# Patient Record
Sex: MALE | Race: WHITE | NOT HISPANIC OR LATINO | Employment: PART TIME | URBAN - NONMETROPOLITAN AREA
[De-identification: names, ages, dates, MRNs, and addresses within clinical notes are randomized per-mention and may not be internally consistent; named-entity substitution may affect disease eponyms.]

---

## 2023-01-14 ENCOUNTER — HOSPITAL ENCOUNTER (EMERGENCY)
Facility: HOSPITAL | Age: 58
Discharge: HOME | End: 2023-01-14
Attending: EMERGENCY MEDICINE

## 2023-01-14 VITALS
BODY MASS INDEX: 35.25 KG/M2 | RESPIRATION RATE: 18 BRPM | TEMPERATURE: 97 F | HEIGHT: 68 IN | OXYGEN SATURATION: 98 % | SYSTOLIC BLOOD PRESSURE: 202 MMHG | HEART RATE: 80 BPM | WEIGHT: 232.59 LBS | DIASTOLIC BLOOD PRESSURE: 111 MMHG

## 2023-01-14 DIAGNOSIS — L03.115 BILATERAL LOWER LEG CELLULITIS: Primary | ICD-10-CM

## 2023-01-14 DIAGNOSIS — L03.116 BILATERAL LOWER LEG CELLULITIS: Primary | ICD-10-CM

## 2023-01-14 DIAGNOSIS — L40.0 PLAQUE PSORIASIS: ICD-10-CM

## 2023-01-14 RX ORDER — LEVETIRACETAM 750 MG/1
1500 TABLET ORAL
COMMUNITY
Start: 2022-12-08

## 2023-01-14 RX ORDER — ASPIRIN 81 MG/1
81 TABLET ORAL
COMMUNITY

## 2023-01-14 RX ORDER — TAMSULOSIN HYDROCHLORIDE 0.4 MG/1
0.4 CAPSULE ORAL
COMMUNITY

## 2023-01-14 RX ORDER — ATORVASTATIN CALCIUM 80 MG/1
80 TABLET, FILM COATED ORAL DAILY
COMMUNITY

## 2023-01-14 RX ORDER — MELATONIN
2000 DAILY
COMMUNITY

## 2023-01-14 RX ORDER — HYDROCODONE BITARTRATE AND ACETAMINOPHEN 5; 325 MG/1; MG/1
1 TABLET ORAL EVERY 6 HOURS PRN
Qty: 10 TABLET | Refills: 0 | Status: SHIPPED | OUTPATIENT
Start: 2023-01-14

## 2023-01-14 RX ORDER — OMEPRAZOLE 40 MG/1
40 CAPSULE, DELAYED RELEASE ORAL
COMMUNITY

## 2023-01-14 RX ORDER — LEVOTHYROXINE SODIUM 0.12 MG/1
TABLET ORAL
COMMUNITY
Start: 2022-10-11

## 2023-01-14 RX ORDER — LISINOPRIL 40 MG/1
40 TABLET ORAL
COMMUNITY

## 2023-01-14 RX ORDER — INSULIN GLARGINE 100 [IU]/ML
50 INJECTION, SOLUTION SUBCUTANEOUS
COMMUNITY

## 2023-01-14 RX ORDER — AMLODIPINE BESYLATE 5 MG/1
2.5 TABLET ORAL EVERY MORNING
COMMUNITY
Start: 2022-08-10 | End: 2023-08-10

## 2023-01-14 RX ORDER — CEPHALEXIN 500 MG/1
500 CAPSULE ORAL 4 TIMES DAILY
Qty: 40 CAPSULE | Refills: 0 | Status: SHIPPED | OUTPATIENT
Start: 2023-01-14 | End: 2023-01-24

## 2023-01-14 RX ORDER — CEPHALEXIN 250 MG/1
500 CAPSULE ORAL ONCE
Status: COMPLETED | OUTPATIENT
Start: 2023-01-14 | End: 2023-01-14

## 2023-01-14 RX ORDER — DULAGLUTIDE 4.5 MG/.5ML
INJECTION, SOLUTION SUBCUTANEOUS
COMMUNITY
Start: 2022-07-27

## 2023-01-14 RX ORDER — ROPINIROLE 2 MG/1
2 TABLET, FILM COATED ORAL
COMMUNITY

## 2023-01-14 RX ORDER — DEXMETHYLPHENIDATE HYDROCHLORIDE 40 MG/1
40 CAPSULE, EXTENDED RELEASE ORAL DAILY
COMMUNITY

## 2023-01-14 RX ORDER — LAMOTRIGINE 100 MG/1
150 TABLET ORAL
COMMUNITY

## 2023-01-14 RX ORDER — HYDROCODONE BITARTRATE AND ACETAMINOPHEN 5; 325 MG/1; MG/1
1 TABLET ORAL ONCE
Status: COMPLETED | OUTPATIENT
Start: 2023-01-14 | End: 2023-01-14

## 2023-01-14 RX ORDER — SERTRALINE HYDROCHLORIDE 100 MG/1
100 TABLET, FILM COATED ORAL DAILY
COMMUNITY

## 2023-01-14 RX ADMIN — HYDROCODONE BITARTRATE AND ACETAMINOPHEN 1 TABLET: 5; 325 TABLET ORAL at 17:18

## 2023-01-14 RX ADMIN — CEPHALEXIN 500 MG: 250 CAPSULE ORAL at 17:17

## 2023-01-14 NOTE — ED PROVIDER NOTES
History  Chief Complaint   Patient presents with   • Leg Swelling     States received Tremfya injection on 12/28/22, short after started with redness/swelling/open areas to bilateral lower legs, right worse than left, believe allergic reaction     Patient is a 78-year-old male presents the emergency department complaining of pain redness and swelling in the bilateral lower extremities and circular patches with white plaques on the lower extremities patient has a history of plaque psoriasis for which she recently was treated with Tremfya and following this developed the pain redness and swelling in the legs  Patient was treated for this at outside hospital and evaluated had no white blood cell count elevation was suspected to be an allergic reaction secondary to the medications lesions now are draining some purulent material   No fevers not dizzy or lightheaded  History provided by:  Patient  Leg Pain  Location:  Leg  Time since incident:  1 week  Injury: no    Leg location:  L lower leg and R lower leg  Associated symptoms: no fatigue and no fever        Prior to Admission Medications   Prescriptions Last Dose Informant Patient Reported? Taking?    Insulin Glargine Solostar (Basaglar KwikPen) 100 UNIT/ML SOPN   Yes No   Sig: Inject 50 Units under the skin   Mometasone Furo-Formoterol Fum (DULERA IN)   Yes Yes   Sig: Inhale if needed   amLODIPine (NORVASC) 5 mg tablet   Yes Yes   Sig: Take 2 5 mg by mouth every morning   aspirin (ECOTRIN LOW STRENGTH) 81 mg EC tablet   Yes No   Sig: Take 81 mg by mouth   atorvastatin (LIPITOR) 80 mg tablet   Yes Yes   Sig: Take 80 mg by mouth daily   cholecalciferol (VITAMIN D3) 1,000 units tablet   Yes Yes   Sig: Take 2,000 Units by mouth daily   dexmethylphenidate (FOCALIN XR) 40 MG 24 hr capsule   Yes Yes   Sig: Take 40 mg by mouth daily   dulaglutide (Trulicity) 4 5 NK/6 3EJ injection   Yes Yes   Sig: INJECT 0 5ML (4 5MG) INTO THE SKIN EVERY 7 DAYS   insulin aspart, w/niacinamide, (FIASP) 100 Units/mL injection pen   Yes No   Sig: Inject 18 Units under the skin   lamoTRIgine (LaMICtal) 100 mg tablet   Yes No   Sig: Take 150 mg by mouth   levETIRAcetam (KEPPRA) 750 mg tablet   Yes Yes   Sig: Take 1,500 mg by mouth   levothyroxine 125 mcg tablet   Yes Yes   Sig: Take 2 tablets (250 mcg) daily   lisinopril (ZESTRIL) 40 mg tablet   Yes No   Sig: Take 40 mg by mouth   omeprazole (PriLOSEC) 40 MG capsule   Yes No   Sig: Take 40 mg by mouth   rOPINIRole (REQUIP) 2 mg tablet   Yes No   Sig: Take 2 mg by mouth   sertraline (ZOLOFT) 100 mg tablet   Yes Yes   Sig: Take 100 mg by mouth daily   tamsulosin (Flomax) 0 4 mg   Yes Yes   Sig: Take 0 4 mg by mouth daily with dinner      Facility-Administered Medications: None       Past Medical History:   Diagnosis Date   • CKD (chronic kidney disease)    • Diabetes mellitus ([place] [id number] )    • Disease of thyroid gland    • High cholesterol    • Hypertension    • Seizure ([place] [address] )        History reviewed  No pertinent surgical history  History reviewed  No pertinent family history  I have reviewed and agree with the history as documented  E-Cigarette/Vaping   • E-Cigarette Use Current Every Day User      E-Cigarette/Vaping Substances   • Nicotine Yes      Social History     Tobacco Use   • Smoking status: Some Days     Types: Cigars   • Smokeless tobacco: Never   Vaping Use   • Vaping Use: Every day   • Substances: Nicotine   Substance Use Topics   • Alcohol use: Not Currently   • Drug use: Not Currently     Types: Cocaine       Review of Systems   Constitutional: Negative for activity change, appetite change, chills, fatigue and fever  HENT: Negative for congestion, ear pain, rhinorrhea and sore throat  Eyes: Negative for discharge, redness and visual disturbance  Respiratory: Negative for cough, chest tightness, shortness of breath and wheezing  Cardiovascular: Negative for chest pain and palpitations     Gastrointestinal: Negative for abdominal pain, constipation, diarrhea, nausea and vomiting  Endocrine: Negative for polydipsia and polyuria  Genitourinary: Negative for difficulty urinating, dysuria, frequency, hematuria and urgency  Musculoskeletal: Negative for arthralgias and myalgias  Bilateral lower leg lesions rash redness swelling drainage pain   Skin: Positive for color change, rash and wound  Negative for pallor  Neurological: Negative for dizziness, weakness, light-headedness, numbness and headaches  Hematological: Negative for adenopathy  Does not bruise/bleed easily  All other systems reviewed and are negative  Physical Exam  Physical Exam  Vitals and nursing note reviewed  Constitutional:       Appearance: He is well-developed  HENT:      Head: Normocephalic and atraumatic  Right Ear: External ear normal       Left Ear: External ear normal       Nose: Nose normal    Eyes:      Conjunctiva/sclera: Conjunctivae normal       Pupils: Pupils are equal, round, and reactive to light  Cardiovascular:      Rate and Rhythm: Normal rate and regular rhythm  Heart sounds: Normal heart sounds  Pulmonary:      Effort: Pulmonary effort is normal  No respiratory distress  Breath sounds: Normal breath sounds  No wheezing or rales  Chest:      Chest wall: No tenderness  Abdominal:      General: Bowel sounds are normal  There is no distension  Palpations: Abdomen is soft  Tenderness: There is no abdominal tenderness  There is no guarding  Musculoskeletal:         General: Normal range of motion  Cervical back: Normal range of motion and neck supple  Right lower leg: Edema present  Left lower leg: Edema present  Skin:     General: Skin is warm and dry  Findings: Erythema and rash present  Rash is crusting and pustular        Comments: Multiple superficial plaque psoriasis lesions on the bilateral lower extremities and feet and ankles with surrounding erythema and some purulent discharge from the lesions  No large abscess or fluid collections   Neurological:      Mental Status: He is alert and oriented to person, place, and time  Cranial Nerves: No cranial nerve deficit  Sensory: No sensory deficit  Vital Signs  ED Triage Vitals [01/14/23 1704]   Temperature Pulse Respirations Blood Pressure SpO2   (!) 97 °F (36 1 °C) 80 18 (!) 202/111 98 %      Temp src Heart Rate Source Patient Position - Orthostatic VS BP Location FiO2 (%)   -- Monitor Lying Right arm --      Pain Score       --           Vitals:    01/14/23 1704   BP: (!) 202/111   Pulse: 80   Patient Position - Orthostatic VS: Lying         Visual Acuity      ED Medications  Medications   cephalexin (KEFLEX) capsule 500 mg (500 mg Oral Given 1/14/23 1717)   HYDROcodone-acetaminophen (NORCO) 5-325 mg per tablet 1 tablet (1 tablet Oral Given 1/14/23 1718)       Diagnostic Studies  Results Reviewed     None                 No orders to display              Procedures  Procedures         ED Course                                             Medical Decision Making  Patient is afebrile nontoxic well-appearing clinically and hemodynamically stable in the emergency department history and examination concerning for infected plaque psoriasis lesions and cellulitis at this time we will treat with antibiotics advised to avoid taking the biologic medication any further and recommended supportive care plenty fluids pain control for now and prompt follow-up with primary physician for further evaluation and treatment return precautions and anticipatory guidance discussed  Bilateral lower leg cellulitis: complicated acute illness or injury  Plaque psoriasis: complicated acute illness or injury  Amount and/or Complexity of Data Reviewed  External Data Reviewed: labs, radiology, ECG and notes  Risk  Prescription drug management            Disposition  Final diagnoses:   Bilateral lower leg cellulitis   Plaque psoriasis - bilateral lower legs infected     Time reflects when diagnosis was documented in both MDM as applicable and the Disposition within this note     Time User Action Codes Description Comment    1/14/2023  5:15 PM Oralee Pro Add [R60 0] Bilateral lower extremity edema     1/14/2023  5:15 PM Oralee Pro Add [L03 116,  X42 718] Bilateral lower leg cellulitis     1/14/2023  5:15 PM Oralee Pro Modify [R13 415,  G69 030] Bilateral lower leg cellulitis     1/14/2023  5:15 PM RemVan oscar Remove [R60 0] Bilateral lower extremity edema     1/14/2023  5:15 PM RemaleVan perry Add [L40 0] Plaque psoriasis     1/14/2023  5:16 PM RemVan oscar Modify [L40 0] Plaque psoriasis Lateral lower legs infected    1/14/2023  5:16 PM RemaleVan perry Modify [L40 0] Plaque psoriasis bilateral lower legs infected      ED Disposition     ED Disposition   Discharge    Condition   Stable    Date/Time   Sat Jan 14, 2023  5:15 PM    Comment   Oralee Pro Javed Sr  discharge to home/self care  Follow-up Information    None         Patient's Medications   Discharge Prescriptions    CEPHALEXIN (KEFLEX) 500 MG CAPSULE    Take 1 capsule (500 mg total) by mouth 4 (four) times a day for 10 days       Start Date: 1/14/2023 End Date: 1/24/2023       Order Dose: 500 mg       Quantity: 40 capsule    Refills: 0    HYDROCODONE-ACETAMINOPHEN (NORCO) 5-325 MG PER TABLET    Take 1 tablet by mouth every 6 (six) hours as needed for pain for up to 10 doses Max Daily Amount: 4 tablets       Start Date: 1/14/2023 End Date: --       Order Dose: 1 tablet       Quantity: 10 tablet    Refills: 0       No discharge procedures on file      PDMP Review     None          ED Provider  Electronically Signed by           Isabel Ernst DO  01/14/23 5339

## 2023-01-14 NOTE — DISCHARGE INSTRUCTIONS
Apply frequent warm soapy compresses to the affected areas follow-up promptly with your primary physician for reevaluation

## 2024-02-06 ENCOUNTER — HOSPITAL ENCOUNTER (INPATIENT)
Facility: HOSPITAL | Age: 59
LOS: 2 days | Discharge: HOME HEALTH | DRG: 682 | End: 2024-02-08
Attending: EMERGENCY MEDICINE | Admitting: FAMILY MEDICINE
Payer: MEDICARE

## 2024-02-06 ENCOUNTER — APPOINTMENT (EMERGENCY)
Dept: CT IMAGING | Facility: HOSPITAL | Age: 59
DRG: 682 | End: 2024-02-06
Payer: MEDICARE

## 2024-02-06 DIAGNOSIS — R29.90 STROKE-LIKE SYMPTOMS: Primary | ICD-10-CM

## 2024-02-06 DIAGNOSIS — E11.00 TYPE 2 DIABETES MELLITUS WITH HYPEROSMOLAR HYPERGLYCEMIC STATE (HHS) (HCC): ICD-10-CM

## 2024-02-06 DIAGNOSIS — E03.8 OTHER SPECIFIED HYPOTHYROIDISM: ICD-10-CM

## 2024-02-06 DIAGNOSIS — R73.9 HYPERGLYCEMIA: ICD-10-CM

## 2024-02-06 PROBLEM — K70.30 ALCOHOLIC CIRRHOSIS (HCC): Status: ACTIVE | Noted: 2024-02-06

## 2024-02-06 PROBLEM — R79.89 PSEUDOHYPONATREMIA: Status: ACTIVE | Noted: 2024-02-06

## 2024-02-06 PROBLEM — R56.9 SEIZURE (HCC): Status: ACTIVE | Noted: 2024-02-06

## 2024-02-06 PROBLEM — I10 HYPERTENSION: Status: ACTIVE | Noted: 2024-02-06

## 2024-02-06 PROBLEM — N17.9 AKI (ACUTE KIDNEY INJURY) (HCC): Status: ACTIVE | Noted: 2024-02-06

## 2024-02-06 PROBLEM — J44.1 CHRONIC OBSTRUCTIVE PULMONARY DISEASE WITH ACUTE EXACERBATION (HCC): Status: ACTIVE | Noted: 2024-02-06

## 2024-02-06 PROBLEM — G93.41 ACUTE METABOLIC ENCEPHALOPATHY: Status: ACTIVE | Noted: 2024-02-06

## 2024-02-06 LAB
2HR DELTA HS TROPONIN: 2 NG/L
4HR DELTA HS TROPONIN: 2 NG/L
ALBUMIN SERPL BCP-MCNC: 5 G/DL (ref 3.5–5)
ALP SERPL-CCNC: 139 U/L (ref 34–104)
ALT SERPL W P-5'-P-CCNC: 256 U/L (ref 7–52)
AMMONIA PLAS-SCNC: 63 UMOL/L (ref 18–72)
AMPHETAMINES SERPL QL SCN: NEGATIVE
ANION GAP SERPL CALCULATED.3IONS-SCNC: 12 MMOL/L
APTT PPP: 28 SECONDS (ref 23–37)
AST SERPL W P-5'-P-CCNC: 147 U/L (ref 13–39)
BACTERIA UR QL AUTO: NORMAL /HPF
BARBITURATES UR QL: NEGATIVE
BASE EX.OXY STD BLDV CALC-SCNC: 80.1 % (ref 60–80)
BASE EXCESS BLDV CALC-SCNC: 0.7 MMOL/L
BENZODIAZ UR QL: NEGATIVE
BETA-HYDROXYBUTYRATE: 0.1 MMOL/L
BILIRUB DIRECT SERPL-MCNC: 0.16 MG/DL (ref 0–0.2)
BILIRUB SERPL-MCNC: 0.74 MG/DL (ref 0.2–1)
BILIRUB UR QL STRIP: NEGATIVE
BNP SERPL-MCNC: 13 PG/ML (ref 0–100)
BUN SERPL-MCNC: 27 MG/DL (ref 5–25)
CALCIUM SERPL-MCNC: 10.5 MG/DL (ref 8.4–10.2)
CARDIAC TROPONIN I PNL SERPL HS: 7 NG/L
CARDIAC TROPONIN I PNL SERPL HS: 9 NG/L
CARDIAC TROPONIN I PNL SERPL HS: 9 NG/L
CHLORIDE SERPL-SCNC: 88 MMOL/L (ref 96–108)
CK SERPL-CCNC: 657 U/L (ref 39–308)
CLARITY UR: CLEAR
CO2 SERPL-SCNC: 26 MMOL/L (ref 21–32)
COCAINE UR QL: NEGATIVE
COLOR UR: YELLOW
CREAT SERPL-MCNC: 1.61 MG/DL (ref 0.6–1.3)
ERYTHROCYTE [DISTWIDTH] IN BLOOD BY AUTOMATED COUNT: 13.9 % (ref 11.6–15.1)
ETHANOL SERPL-MCNC: <10 MG/DL
FLUAV RNA RESP QL NAA+PROBE: NEGATIVE
FLUBV RNA RESP QL NAA+PROBE: NEGATIVE
GFR SERPL CREATININE-BSD FRML MDRD: 46 ML/MIN/1.73SQ M
GLUCOSE SERPL-MCNC: 341 MG/DL (ref 65–140)
GLUCOSE SERPL-MCNC: 395 MG/DL (ref 65–140)
GLUCOSE SERPL-MCNC: 419 MG/DL (ref 65–140)
GLUCOSE SERPL-MCNC: 629 MG/DL (ref 65–140)
GLUCOSE SERPL-MCNC: >500 MG/DL (ref 65–140)
GLUCOSE UR STRIP-MCNC: ABNORMAL MG/DL
HCO3 BLDV-SCNC: 24.9 MMOL/L (ref 24–30)
HCT VFR BLD AUTO: 42.4 % (ref 36.5–49.3)
HGB BLD-MCNC: 14.4 G/DL (ref 12–17)
HGB UR QL STRIP.AUTO: ABNORMAL
INR PPP: 0.89 (ref 0.84–1.19)
KETONES UR STRIP-MCNC: NEGATIVE MG/DL
LACTATE SERPL-SCNC: 1.7 MMOL/L (ref 0.5–2)
LACTATE SERPL-SCNC: 2.2 MMOL/L (ref 0.5–2)
LACTATE SERPL-SCNC: 2.3 MMOL/L (ref 0.5–2)
LACTATE SERPL-SCNC: 2.9 MMOL/L (ref 0.5–2)
LEUKOCYTE ESTERASE UR QL STRIP: ABNORMAL
LIPASE SERPL-CCNC: 117 U/L (ref 11–82)
MCH RBC QN AUTO: 31.8 PG (ref 26.8–34.3)
MCHC RBC AUTO-ENTMCNC: 34 G/DL (ref 31.4–37.4)
MCV RBC AUTO: 94 FL (ref 82–98)
METHADONE UR QL: NEGATIVE
NITRITE UR QL STRIP: NEGATIVE
NON-SQ EPI CELLS URNS QL MICRO: NORMAL /HPF
O2 CT BLDV-SCNC: 16.6 ML/DL
OPIATES UR QL SCN: NEGATIVE
OSMOLALITY UR/SERPL-RTO: 325 MMOL/KG (ref 282–298)
OXYCODONE+OXYMORPHONE UR QL SCN: NEGATIVE
PCO2 BLDV: 38.7 MM HG (ref 42–50)
PCP UR QL: NEGATIVE
PH BLDV: 7.43 [PH] (ref 7.3–7.4)
PH UR STRIP.AUTO: 5.5 [PH]
PHOSPHATE SERPL-MCNC: 4.5 MG/DL (ref 2.7–4.5)
PLATELET # BLD AUTO: 150 THOUSANDS/UL (ref 149–390)
PMV BLD AUTO: 13.5 FL (ref 8.9–12.7)
PO2 BLDV: 48.2 MM HG (ref 35–45)
POTASSIUM SERPL-SCNC: 4.6 MMOL/L (ref 3.5–5.3)
PROCALCITONIN SERPL-MCNC: 0.24 NG/ML
PROT SERPL-MCNC: 8.5 G/DL (ref 6.4–8.4)
PROT UR STRIP-MCNC: ABNORMAL MG/DL
PROTHROMBIN TIME: 12.3 SECONDS (ref 11.6–14.5)
RBC # BLD AUTO: 4.53 MILLION/UL (ref 3.88–5.62)
RBC #/AREA URNS AUTO: NORMAL /HPF
RSV RNA RESP QL NAA+PROBE: NEGATIVE
SARS-COV-2 RNA RESP QL NAA+PROBE: NEGATIVE
SODIUM SERPL-SCNC: 126 MMOL/L (ref 135–147)
SP GR UR STRIP.AUTO: 1.01 (ref 1–1.03)
THC UR QL: NEGATIVE
TSH SERPL DL<=0.05 MIU/L-ACNC: 69.7 UIU/ML (ref 0.45–4.5)
UROBILINOGEN UR QL STRIP.AUTO: 0.2 E.U./DL
WBC # BLD AUTO: 8.82 THOUSAND/UL (ref 4.31–10.16)
WBC #/AREA URNS AUTO: NORMAL /HPF

## 2024-02-06 PROCEDURE — 82550 ASSAY OF CK (CPK): CPT | Performed by: FAMILY MEDICINE

## 2024-02-06 PROCEDURE — 0241U HB NFCT DS VIR RESP RNA 4 TRGT: CPT | Performed by: EMERGENCY MEDICINE

## 2024-02-06 PROCEDURE — 81001 URINALYSIS AUTO W/SCOPE: CPT | Performed by: EMERGENCY MEDICINE

## 2024-02-06 PROCEDURE — 87040 BLOOD CULTURE FOR BACTERIA: CPT | Performed by: EMERGENCY MEDICINE

## 2024-02-06 PROCEDURE — 84100 ASSAY OF PHOSPHORUS: CPT

## 2024-02-06 PROCEDURE — 85027 COMPLETE CBC AUTOMATED: CPT | Performed by: EMERGENCY MEDICINE

## 2024-02-06 PROCEDURE — 96361 HYDRATE IV INFUSION ADD-ON: CPT

## 2024-02-06 PROCEDURE — 96374 THER/PROPH/DIAG INJ IV PUSH: CPT

## 2024-02-06 PROCEDURE — 82010 KETONE BODYS QUAN: CPT | Performed by: EMERGENCY MEDICINE

## 2024-02-06 PROCEDURE — 71250 CT THORAX DX C-: CPT

## 2024-02-06 PROCEDURE — 82077 ASSAY SPEC XCP UR&BREATH IA: CPT | Performed by: EMERGENCY MEDICINE

## 2024-02-06 PROCEDURE — 84484 ASSAY OF TROPONIN QUANT: CPT | Performed by: EMERGENCY MEDICINE

## 2024-02-06 PROCEDURE — 83605 ASSAY OF LACTIC ACID: CPT | Performed by: FAMILY MEDICINE

## 2024-02-06 PROCEDURE — 36415 COLL VENOUS BLD VENIPUNCTURE: CPT | Performed by: EMERGENCY MEDICINE

## 2024-02-06 PROCEDURE — 74176 CT ABD & PELVIS W/O CONTRAST: CPT

## 2024-02-06 PROCEDURE — 93005 ELECTROCARDIOGRAM TRACING: CPT

## 2024-02-06 PROCEDURE — 80076 HEPATIC FUNCTION PANEL: CPT

## 2024-02-06 PROCEDURE — 80177 DRUG SCRN QUAN LEVETIRACETAM: CPT

## 2024-02-06 PROCEDURE — 99223 1ST HOSP IP/OBS HIGH 75: CPT | Performed by: FAMILY MEDICINE

## 2024-02-06 PROCEDURE — 83690 ASSAY OF LIPASE: CPT

## 2024-02-06 PROCEDURE — 83605 ASSAY OF LACTIC ACID: CPT | Performed by: EMERGENCY MEDICINE

## 2024-02-06 PROCEDURE — 82805 BLOOD GASES W/O2 SATURATION: CPT | Performed by: EMERGENCY MEDICINE

## 2024-02-06 PROCEDURE — 99285 EMERGENCY DEPT VISIT HI MDM: CPT

## 2024-02-06 PROCEDURE — 82948 REAGENT STRIP/BLOOD GLUCOSE: CPT

## 2024-02-06 PROCEDURE — 80048 BASIC METABOLIC PNL TOTAL CA: CPT | Performed by: EMERGENCY MEDICINE

## 2024-02-06 PROCEDURE — 94640 AIRWAY INHALATION TREATMENT: CPT

## 2024-02-06 PROCEDURE — 80307 DRUG TEST PRSMV CHEM ANLYZR: CPT | Performed by: EMERGENCY MEDICINE

## 2024-02-06 PROCEDURE — 82140 ASSAY OF AMMONIA: CPT | Performed by: EMERGENCY MEDICINE

## 2024-02-06 PROCEDURE — 84145 PROCALCITONIN (PCT): CPT

## 2024-02-06 PROCEDURE — 83930 ASSAY OF BLOOD OSMOLALITY: CPT | Performed by: EMERGENCY MEDICINE

## 2024-02-06 PROCEDURE — 80175 DRUG SCREEN QUAN LAMOTRIGINE: CPT

## 2024-02-06 PROCEDURE — 84443 ASSAY THYROID STIM HORMONE: CPT

## 2024-02-06 PROCEDURE — 85610 PROTHROMBIN TIME: CPT | Performed by: EMERGENCY MEDICINE

## 2024-02-06 PROCEDURE — 85730 THROMBOPLASTIN TIME PARTIAL: CPT | Performed by: EMERGENCY MEDICINE

## 2024-02-06 PROCEDURE — 80074 ACUTE HEPATITIS PANEL: CPT

## 2024-02-06 PROCEDURE — 84439 ASSAY OF FREE THYROXINE: CPT

## 2024-02-06 PROCEDURE — 83880 ASSAY OF NATRIURETIC PEPTIDE: CPT | Performed by: EMERGENCY MEDICINE

## 2024-02-06 RX ORDER — IPRATROPIUM BROMIDE AND ALBUTEROL SULFATE 2.5; .5 MG/3ML; MG/3ML
3 SOLUTION RESPIRATORY (INHALATION)
Status: DISCONTINUED | OUTPATIENT
Start: 2024-02-06 | End: 2024-02-07

## 2024-02-06 RX ORDER — LAMOTRIGINE 100 MG/1
200 TABLET ORAL DAILY
Status: DISCONTINUED | OUTPATIENT
Start: 2024-02-07 | End: 2024-02-08 | Stop reason: HOSPADM

## 2024-02-06 RX ORDER — HEPARIN SODIUM 5000 [USP'U]/ML
5000 INJECTION, SOLUTION INTRAVENOUS; SUBCUTANEOUS EVERY 8 HOURS SCHEDULED
Status: DISCONTINUED | OUTPATIENT
Start: 2024-02-06 | End: 2024-02-08 | Stop reason: HOSPADM

## 2024-02-06 RX ORDER — ROPINIROLE 1 MG/1
2 TABLET, FILM COATED ORAL
Status: DISCONTINUED | OUTPATIENT
Start: 2024-02-06 | End: 2024-02-08 | Stop reason: HOSPADM

## 2024-02-06 RX ORDER — LEVOTHYROXINE SODIUM 0.12 MG/1
250 TABLET ORAL
Status: DISCONTINUED | OUTPATIENT
Start: 2024-02-07 | End: 2024-02-08 | Stop reason: HOSPADM

## 2024-02-06 RX ORDER — ACETAMINOPHEN 325 MG/1
650 TABLET ORAL EVERY 4 HOURS PRN
Status: DISCONTINUED | OUTPATIENT
Start: 2024-02-06 | End: 2024-02-08 | Stop reason: HOSPADM

## 2024-02-06 RX ORDER — VALACYCLOVIR HYDROCHLORIDE 500 MG/1
500 TABLET, FILM COATED ORAL
COMMUNITY

## 2024-02-06 RX ORDER — NICOTINE 21 MG/24HR
1 PATCH, TRANSDERMAL 24 HOURS TRANSDERMAL DAILY
Status: DISCONTINUED | OUTPATIENT
Start: 2024-02-07 | End: 2024-02-08 | Stop reason: HOSPADM

## 2024-02-06 RX ORDER — PANTOPRAZOLE SODIUM 40 MG/1
40 TABLET, DELAYED RELEASE ORAL
Status: DISCONTINUED | OUTPATIENT
Start: 2024-02-07 | End: 2024-02-08 | Stop reason: HOSPADM

## 2024-02-06 RX ORDER — DICYCLOMINE HCL 20 MG
20 TABLET ORAL
Status: DISCONTINUED | OUTPATIENT
Start: 2024-02-06 | End: 2024-02-08 | Stop reason: HOSPADM

## 2024-02-06 RX ORDER — LEVETIRACETAM 500 MG/1
1500 TABLET ORAL EVERY 12 HOURS SCHEDULED
Status: DISCONTINUED | OUTPATIENT
Start: 2024-02-06 | End: 2024-02-08 | Stop reason: HOSPADM

## 2024-02-06 RX ORDER — ASPIRIN 81 MG/1
81 TABLET, CHEWABLE ORAL DAILY
Status: DISCONTINUED | OUTPATIENT
Start: 2024-02-07 | End: 2024-02-08 | Stop reason: HOSPADM

## 2024-02-06 RX ORDER — AZITHROMYCIN 250 MG/1
500 TABLET, FILM COATED ORAL EVERY 24 HOURS
Status: DISCONTINUED | OUTPATIENT
Start: 2024-02-06 | End: 2024-02-08

## 2024-02-06 RX ORDER — DICYCLOMINE HCL 20 MG
20 TABLET ORAL
COMMUNITY

## 2024-02-06 RX ORDER — ROPINIROLE 0.5 MG/1
0.5 TABLET, FILM COATED ORAL
COMMUNITY

## 2024-02-06 RX ORDER — METHOTREXATE 2.5 MG/1
2.5 TABLET ORAL WEEKLY
COMMUNITY

## 2024-02-06 RX ORDER — SODIUM CHLORIDE 9 MG/ML
125 INJECTION, SOLUTION INTRAVENOUS CONTINUOUS
Status: DISCONTINUED | OUTPATIENT
Start: 2024-02-06 | End: 2024-02-07

## 2024-02-06 RX ORDER — TAMSULOSIN HYDROCHLORIDE 0.4 MG/1
0.4 CAPSULE ORAL
Status: DISCONTINUED | OUTPATIENT
Start: 2024-02-06 | End: 2024-02-08 | Stop reason: HOSPADM

## 2024-02-06 RX ORDER — LANOLIN ALCOHOL/MO/W.PET/CERES
3 CREAM (GRAM) TOPICAL
COMMUNITY

## 2024-02-06 RX ORDER — ATORVASTATIN CALCIUM 40 MG/1
40 TABLET, FILM COATED ORAL EVERY EVENING
Status: DISCONTINUED | OUTPATIENT
Start: 2024-02-06 | End: 2024-02-08 | Stop reason: HOSPADM

## 2024-02-06 RX ORDER — FOLIC ACID 1 MG/1
1 TABLET ORAL DAILY
Status: DISCONTINUED | OUTPATIENT
Start: 2024-02-07 | End: 2024-02-08 | Stop reason: HOSPADM

## 2024-02-06 RX ORDER — ROPINIROLE 0.25 MG/1
0.5 TABLET, FILM COATED ORAL
Status: DISCONTINUED | OUTPATIENT
Start: 2024-02-06 | End: 2024-02-08 | Stop reason: HOSPADM

## 2024-02-06 RX ORDER — LANOLIN ALCOHOL/MO/W.PET/CERES
3 CREAM (GRAM) TOPICAL
Status: DISCONTINUED | OUTPATIENT
Start: 2024-02-06 | End: 2024-02-08 | Stop reason: HOSPADM

## 2024-02-06 RX ORDER — FOLIC ACID 1 MG/1
TABLET ORAL DAILY
COMMUNITY

## 2024-02-06 RX ADMIN — SODIUM CHLORIDE 9 UNITS/HR: 9 INJECTION, SOLUTION INTRAVENOUS at 19:42

## 2024-02-06 RX ADMIN — DICYCLOMINE HYDROCHLORIDE 20 MG: 20 TABLET ORAL at 22:21

## 2024-02-06 RX ADMIN — ATORVASTATIN CALCIUM 40 MG: 40 TABLET, FILM COATED ORAL at 19:30

## 2024-02-06 RX ADMIN — TAMSULOSIN HYDROCHLORIDE 0.4 MG: 0.4 CAPSULE ORAL at 19:30

## 2024-02-06 RX ADMIN — ACETAMINOPHEN 650 MG: 325 TABLET, FILM COATED ORAL at 22:21

## 2024-02-06 RX ADMIN — ROPINIROLE 2 MG: 1 TABLET, FILM COATED ORAL at 22:21

## 2024-02-06 RX ADMIN — INSULIN HUMAN 15 UNITS: 100 INJECTION, SOLUTION PARENTERAL at 17:13

## 2024-02-06 RX ADMIN — SODIUM CHLORIDE 125 ML/HR: 0.9 INJECTION, SOLUTION INTRAVENOUS at 19:30

## 2024-02-06 RX ADMIN — AZITHROMYCIN 500 MG: 250 TABLET, FILM COATED ORAL at 19:30

## 2024-02-06 RX ADMIN — HEPARIN SODIUM 5000 UNITS: 5000 INJECTION INTRAVENOUS; SUBCUTANEOUS at 22:21

## 2024-02-06 RX ADMIN — Medication 3 MG: at 22:21

## 2024-02-06 RX ADMIN — SODIUM CHLORIDE 1000 ML: 0.9 INJECTION, SOLUTION INTRAVENOUS at 17:06

## 2024-02-06 RX ADMIN — LEVETIRACETAM 1500 MG: 500 TABLET, FILM COATED ORAL at 20:42

## 2024-02-06 RX ADMIN — IPRATROPIUM BROMIDE AND ALBUTEROL SULFATE 3 ML: 2.5; .5 SOLUTION RESPIRATORY (INHALATION) at 19:34

## 2024-02-06 NOTE — QUICK NOTE
Stroke alert note:    Called at 4:13 pm  Neurology responded immediately.    A 59 y/o male with complicated history of cirrhosis (alcohol related), DM II, epilepsy on Keppra who presents with slurred speech. Friend stated that he last saw him normal at 09:00 am. At 3:00 pm, the same friend called and found him dysarthric and slightly confused. Patient himself said he had not been talking, so he was not aware of the deficits.    ED MD exam shows dysarthria. NIHSS 1    Of note, he has FSG of > 500. He is also undergoing work-up for sepsis.        CT head shows no territorial infarct. There is a small hypodensity in left basal ganglia (caudate head), likely chronic but difficult to determine. CTA is pending.    Impression:  Description sounds more consistent with toxic/metabolic encephalopathy, perhaps due to diabetic ketoacidosis.     TNK not given: outside window. Low NIHSS    Plan is to perform MRI brain to exclude stroke. If negative, then addressing other comorbid conditions as per primary team.

## 2024-02-06 NOTE — ASSESSMENT & PLAN NOTE
Creatine on admission 1.61, Baseline creatinine 1.1  Suspect secondary to dehydration   Hold ACE/ARB and diuretic therapy  Avoid hypotension, nephrotoxins, and NSAIDS if possible    IV fluids   Strict I & Os  Urinary retention protocol and bladder scan

## 2024-02-06 NOTE — ASSESSMENT & PLAN NOTE
Low sodium in light of hyperglycemia. Sodium on admission 126 with glucose of 629  Sodium corrects to 139  Continue to monitor patient's sodium while correcting sugars

## 2024-02-06 NOTE — H&P
[facility]  H&P  Name: Alli Burt Sr. 58 y.o. male I MRN: [id number]  Unit/Bed#: Z1 H10 I Date of Admission: 2/6/2024   Date of Service: 2/6/2024 I Hospital Day: 0      Assessment/Plan   * Stroke-like symptoms  Assessment & Plan  Presents to ED with slurred speech and taking longer time to answer questions per patient's friend. Feels dizzy and tired since waking up.   NIH in ED 1, NIH now = 1; symptoms of dysarthria  CT scan Hypodensities within the left anterior limb of the internal capsule/basal ganglia may represent age-indeterminate infarcts. Differential includes microangiopathic changes among other etiologies. Clinical correlation advised.   CTA head/neck pending  MRI ordered   Stroke pathway  Permissive hypertension if s/s <24 hours   DVT prophylaxis   PT/OT/ST  Statin  Lipid panel/TSH/Hgb A1C ordered   Telemetry, neuro checks   Neurology consult; recommendations appreciated    Type 2 diabetes mellitus with hyperosmolar hyperglycemic state (HHS) (HCC)  Assessment & Plan    Lab Results   Component Value Date    HGBA1C 8.5 (H) 10/11/2022     Presents to ED with slurred speech and taking longer to answer questions per patient's friend. Patient's home diabetic regimen includes jardiance, trulicity, novolog 18 units, and lantus 50 units - appears outpatient endo office has been telling patient to come to the ED due to sugars being high and patient was out of meds for some time.   Glucose in the ED on   Anion gap: 12  Betahydroxybutyrate 0.1  Lactic acid 2.3  Urine ketones: negative  Potassium: 4.6  Sodium: 126 (corrected to 139)  Phosphate: ordered  WBC normal  Strict I & Os  Aggressive IV hydration  Insulin drip and IVF rehydration started, continue to monitor sugars    Acute metabolic encephalopathy  Assessment & Plan  Suspected due to hyperglycemia in setting of sugars >500. Stroke workup was initiated. Infectious workup being done  UA without evidence of  infection  Mildly elevated lactic acid  Blood cultures ordered  Ethanol negative  UDS negative  No leukocytosis  Procal ordered  Control sugars and monitor symptoms    JOHN (acute kidney injury) (HCC)  Assessment & Plan  Creatine on admission 1.61, Baseline creatinine 1.1  Suspect secondary to dehydration   Hold ACE/ARB and diuretic therapy  Avoid hypotension, nephrotoxins, and NSAIDS if possible    IV fluids   Strict I & Os  Urinary retention protocol and bladder scan    Chronic obstructive pulmonary disease with acute exacerbation (HCC)  Assessment & Plan  Patient presented to ED with slurred speech and delayed responses  Outpatient medications: dulera prn  Baseline oxygen use: none  CT CAP: No evidence of an acute inflammatory process.   Sputum cultures ordered  Respiratory protocol    Steroids not ordered due to patient currently being in [facility], will do duo-nebs  Azithromycin for exacerbation    Pseudohyponatremia  Assessment & Plan  Low sodium in light of hyperglycemia. Sodium on admission 126 with glucose of 629  Sodium corrects to 139  Continue to monitor patient's sodium while correcting sugars    Other specified hypothyroidism  Assessment & Plan  Patient currently taking synthroid 250 mcg outpatient, outpatient endo recommending coming to ED for evaluation due to elevated sugars and severe hypothryoidism  TSH ordered    Alcoholic cirrhosis (HCC)  Assessment & Plan  With history of alcoholic cirrhosis  Ethanol normal on admission  UDS negative  LFTs ordered  Ammonia normal  CT CAP: Hepatomegaly and diffuse hepatic steatosis. Liver is diffusely heterogeneous. Evaluation is limited due to the lack of IV contrast and tumor is not excluded. Given the history of cirrhosis, recommend MRI using liver mass protocol. No evidence of an acute inflammatory process.  Continue to monitor and outpatient follow up.     Hypertension  Assessment & Plan  History of HTN on novrasc and lisinopril  Currently with elevated  "creatinine, hold lisinopril and hold norvasc in light of stroke like symptoms  Allow for permissive HTN at this time  Avoid hypotension    Seizure (HCC)  Assessment & Plan  With history of seizures, currently following with neuro outpatient  Continue home seizure medications  Keppra level ordered         VTE Pharmacologic Prophylaxis: VTE Score: 3 Moderate Risk (Score 3-4) - Pharmacological DVT Prophylaxis Ordered: heparin.  Code Status: Level 1 - Full Code   Discussion with family: Updated  (friend) at bedside.    Anticipated Length of Stay: Patient will be admitted on an inpatient basis with an anticipated length of stay of greater than 2 midnights secondary to HHS, stroke like symptoms, AMS, COPD exacerbation.    Total Time Spent on Date of Encounter in care of patient: 75 mins. This time was spent on one or more of the following: performing physical exam; counseling and coordination of care; obtaining or reviewing history; documenting in the medical record; reviewing/ordering tests, medications or procedures; communicating with other healthcare professionals and discussing with patient's family/caregivers.    Chief Complaint: slurred speech    History of Present Illness:  Alli Burt Walter is a 58 y.o. male with a PMH of COPD, VALERIA, type 2 DM, hypothyroidism, HTN, alcoholic cirrhosis, seizure history who presents with slurred speech that stared around 3pm today. Patient called friend to talk and friend noted that he was slurred with speech and delayed with responses. Prompted patient to come to the ED for evaluation. Patient came to the ED and found to have elevated sugars. Still with some slurred/slowed speech. Said he also has some bilateral calf pain. No chest pain. Does have shortness of breath and wheezing. Follow up with PCP outpatient for \"cold\" and took 1 week of antibiotics, but no relief. No fever, chills, nausea, vomiting, diarrhea, constipation, abdominal pain. Patient is not " currently drinking. Not using drugs. Did eat lunch today. Is unsure if he used his insulin. Of note he was out of his insulin for quite a few days without calling his endocrinologist for refills. Patient currently feeling better. Does still smoke cigarettes.     Review of Systems:  Review of Systems   Constitutional:  Negative for activity change, appetite change, chills, fatigue and fever.   HENT: Negative.     Eyes: Negative.    Respiratory:  Positive for cough, shortness of breath and wheezing. Negative for chest tightness.    Cardiovascular:  Negative for chest pain, palpitations and leg swelling.   Gastrointestinal:  Negative for abdominal distention, abdominal pain, constipation, diarrhea, nausea and vomiting.   Endocrine: Negative.    Genitourinary:  Negative for difficulty urinating, dysuria, frequency, hematuria and urgency.   Musculoskeletal:  Positive for gait problem (patient felt he had difficulty with ambulation) and myalgias (bilateral calf pain).   Skin: Negative.    Allergic/Immunologic: Negative.    Neurological:  Positive for dizziness, speech difficulty and light-headedness. Negative for tremors, syncope, facial asymmetry, weakness, numbness and headaches.   Hematological: Negative.    Psychiatric/Behavioral:  Positive for confusion. The patient is not nervous/anxious.        Past Medical and Surgical History:   Past Medical History:   Diagnosis Date    CKD (chronic kidney disease)     Diabetes mellitus (HCC)     Disease of thyroid gland     High cholesterol     Hypertension 2/6/2024    Seizure (HCC) 2/6/2024       History reviewed. No pertinent surgical history.    Meds/Allergies:  Prior to Admission medications    Medication Sig Start Date End Date Taking? Authorizing Provider   amLODIPine (NORVASC) 5 mg tablet Take 2.5 mg by mouth every morning 8/10/22 2/6/24 Yes Historical Provider, MD   dexmethylphenidate (FOCALIN XR) 40 MG 24 hr capsule Take 40 mg by mouth daily   Yes Historical Provider,  MD   insulin aspart, w/niacinamide, (FIASP) 100 Units/mL injection pen Inject 18 Units under the skin   Yes Historical Provider, MD   Insulin Glargine Solostar (Basaglar KwikPen) 100 UNIT/ML SOPN Inject 50 Units under the skin   Yes Historical Provider, MD   lamoTRIgine (LaMICtal) 100 mg tablet Take 150 mg by mouth   Yes Historical Provider, MD   levETIRAcetam (KEPPRA) 750 mg tablet Take 1,500 mg by mouth 12/8/22  Yes Historical Provider, MD   levothyroxine 125 mcg tablet Take 2 tablets (250 mcg) daily 10/11/22  Yes Historical Provider, MD   lisinopril (ZESTRIL) 40 mg tablet Take 40 mg by mouth   Yes Historical Provider, MD   Mometasone Furo-Formoterol Fum (DULERA IN) Inhale if needed   Yes Historical Provider, MD   omeprazole (PriLOSEC) 40 MG capsule Take 40 mg by mouth   Yes Historical Provider, MD   rOPINIRole (REQUIP) 2 mg tablet Take 2 mg by mouth   Yes Historical Provider, MD   sertraline (ZOLOFT) 100 mg tablet Take 100 mg by mouth daily   Yes Historical Provider, MD   tamsulosin (Flomax) 0.4 mg Take 0.4 mg by mouth daily with dinner   Yes Historical Provider, MD   aspirin (ECOTRIN LOW STRENGTH) 81 mg EC tablet Take 81 mg by mouth  Patient not taking: Reported on 2/6/2024    Historical Provider, MD   atorvastatin (LIPITOR) 80 mg tablet Take 80 mg by mouth daily  Patient not taking: Reported on 2/6/2024    Historical Provider, MD   cholecalciferol (VITAMIN D3) 1,000 units tablet Take 2,000 Units by mouth daily  Patient not taking: Reported on 2/6/2024    Historical Provider, MD   dulaglutide (Trulicity) 4.5 MG/0.5ML injection INJECT 0.5ML (4.5MG) INTO THE SKIN EVERY 7 DAYS  Patient not taking: Reported on 2/6/2024 7/27/22   Historical Provider, MD   HYDROcodone-acetaminophen (NORCO) 5-325 mg per tablet Take 1 tablet by mouth every 6 (six) hours as needed for pain for up to 10 doses Max Daily Amount: 4 tablets  Patient not taking: Reported on 2/6/2024 1/14/23   Van Hodgson, DO     I have reviewed home  "medications using recent Epic encounter.    Allergies:   Allergies   Allergen Reactions    Nitroglycerin Anaphylaxis     Patient reports he is not to take because of his kidneys  Patient reports he had a seizure when given it.         Social History:  Marital Status:    Occupation:   Patient Pre-hospital Living Situation: Home  Patient Pre-hospital Level of Mobility: walks  Patient Pre-hospital Diet Restrictions:   Substance Use History:   Social History     Substance and Sexual Activity   Alcohol Use Not Currently     Social History     Tobacco Use   Smoking Status Some Days    Types: Cigars   Smokeless Tobacco Never     Social History     Substance and Sexual Activity   Drug Use Not Currently    Types: Cocaine       Family History:  History reviewed. No pertinent family history.    Physical Exam:     Vitals:   Blood Pressure: 155/99 (02/06/24 1830)  Pulse: 82 (02/06/24 1830)  Temperature: 98 °F (36.7 °C) (02/06/24 1553)  Respirations: 16 (02/06/24 1830)  Height: 5' 8\" (172.7 cm) (02/06/24 1553)  Weight - Scale: 109 kg (240 lb) (02/06/24 1553)  SpO2: 95 % (02/06/24 1830)    Physical Exam  Vitals reviewed.   Constitutional:       General: He is not in acute distress.     Appearance: He is obese. He is ill-appearing. He is not toxic-appearing.   HENT:      Head: Normocephalic and atraumatic.      Mouth/Throat:      Mouth: Mucous membranes are dry.   Eyes:      General: No visual field deficit.  Cardiovascular:      Rate and Rhythm: Normal rate and regular rhythm.      Heart sounds: No murmur heard.  Pulmonary:      Effort: No respiratory distress.      Breath sounds: No stridor. Wheezing present. No rhonchi or rales.      Comments: Saturating well on room air  Musculoskeletal:         General: No swelling or tenderness.      Right lower leg: No edema.      Left lower leg: No edema.   Skin:     General: Skin is warm and dry.   Neurological:      General: No focal deficit present.      Mental Status: He is " alert and oriented to person, place, and time.      Cranial Nerves: Dysarthria (mild dysarthria with certain words, but speech improved per patient and patient's friend) present. No cranial nerve deficit or facial asymmetry.      Motor: No weakness, tremor, abnormal muscle tone, seizure activity or pronator drift.      Coordination: Coordination is intact.      Comments: Still with some delayed responses to questions   Psychiatric:         Mood and Affect: Mood normal.         Behavior: Behavior normal.          Additional Data:     Lab Results:  Results from last 7 days   Lab Units 02/06/24  1627   WBC Thousand/uL 8.82   HEMOGLOBIN g/dL 14.4   HEMATOCRIT % 42.4   PLATELETS Thousands/uL 150     Results from last 7 days   Lab Units 02/06/24  1627   SODIUM mmol/L 126*   POTASSIUM mmol/L 4.6   CHLORIDE mmol/L 88*   CO2 mmol/L 26   BUN mg/dL 27*   CREATININE mg/dL 1.61*   ANION GAP mmol/L 12   CALCIUM mg/dL 10.5*   GLUCOSE RANDOM mg/dL 629*     Results from last 7 days   Lab Units 02/06/24  1627   INR  0.89     Results from last 7 days   Lab Units 02/06/24  1757 02/06/24  1552   POC GLUCOSE mg/dl 419* >500*         Results from last 7 days   Lab Units 02/06/24  1627   LACTIC ACID mmol/L 2.3*       Lines/Drains:  Invasive Devices       Peripheral Intravenous Line  Duration             Peripheral IV 02/06/24 Right Antecubital <1 day                        Imaging: Reviewed radiology reports from this admission including: chest CT scan, abdominal/pelvic CT, and CT head  CT chest abdomen pelvis wo contrast   Final Result by Hanna Bocanegra MD (02/06 1710)      Hepatomegaly and diffuse hepatic steatosis. Liver is diffusely heterogeneous. Evaluation is limited due to the lack of IV contrast and tumor is not excluded. Given the history of cirrhosis, recommend MRI using liver mass protocol.      No evidence of an acute inflammatory process.               Workstation performed: [id number]         CT stroke alert brain   Final  Result by Uriel Santa MD (02/06 4183)      -Hypodensities within the left anterior limb of the internal capsule/basal ganglia may represent age-indeterminate infarcts. Differential includes microangiopathic changes among other etiologies. Clinical correlation advised.            I personally communicated the findings via telephone with Shahnaz Rodriguez at approximately 4:32 p.m. on 2/6/2024.      Workstation performed: [id number]         CTA stroke alert (head/neck)    (Results Pending)   MRI Inpatient Order    (Results Pending)       EKG and Other Studies Reviewed on Admission:   EKG: NSR. HR 82.    ** Please Note: This note has been constructed using a voice recognition system. **

## 2024-02-06 NOTE — ASSESSMENT & PLAN NOTE
Patient presented to ED with slurred speech and delayed responses  Outpatient medications: dulera prn  Baseline oxygen use: none  CT CAP: No evidence of an acute inflammatory process.   Sputum cultures ordered  Respiratory protocol    Steroids not ordered due to patient currently being in HHS, will do duo-nebs  Azithromycin for exacerbation

## 2024-02-06 NOTE — ASSESSMENT & PLAN NOTE
Patient currently taking synthroid 250 mcg outpatient, outpatient endo recommending coming to ED for evaluation due to elevated sugars and severe hypothryoidism  TSH ordered

## 2024-02-06 NOTE — ASSESSMENT & PLAN NOTE
Suspected due to hyperglycemia in setting of sugars >500. Stroke workup was initiated. Infectious workup being done  UA without evidence of infection  Mildly elevated lactic acid  Blood cultures ordered  Ethanol negative  UDS negative  No leukocytosis  Procal ordered  Control sugars and monitor symptoms

## 2024-02-06 NOTE — ED PROVIDER NOTES
History  Chief Complaint   Patient presents with    Slurred Speech     Pt called his friend around 1500 and his friends states his speech was slurred and he was taking a long time to answer his questions. Pt states he feels dizzy and tired since waking up.     58-year-old male accompanied by friend who describes slurred speech and blunted affect noticed during call by patient at approximately 3 PM, patient unaware, friend saw patient 930 this morning and notes he was normal, articulate and speaking appropriately.  Patient notes he is compliant with his medications and has pain all over.      History provided by:  Patient  STROKE Alert  Severity:  Moderate  Onset quality:  Unable to specify  Timing:  Constant  Progression:  Unchanged  Chronicity:  New  Context:  Atraumatic  Relieved by:  Nothing  Worsened by:  Nothing tried  Ineffective treatments:  None  Associated symptoms: no abdominal pain, no chest pain, no fever and no shortness of breath        Prior to Admission Medications   Prescriptions Last Dose Informant Patient Reported? Taking?   HYDROcodone-acetaminophen (NORCO) 5-325 mg per tablet   No No   Sig: Take 1 tablet by mouth every 6 (six) hours as needed for pain for up to 10 doses Max Daily Amount: 4 tablets   Insulin Glargine Solostar (Basaglar KwikPen) 100 UNIT/ML SOPN   Yes No   Sig: Inject 50 Units under the skin   Mometasone Furo-Formoterol Fum (DULERA IN)   Yes No   Sig: Inhale if needed   amLODIPine (NORVASC) 5 mg tablet   Yes No   Sig: Take 2.5 mg by mouth every morning   aspirin (ECOTRIN LOW STRENGTH) 81 mg EC tablet   Yes No   Sig: Take 81 mg by mouth   atorvastatin (LIPITOR) 80 mg tablet   Yes No   Sig: Take 80 mg by mouth daily   cholecalciferol (VITAMIN D3) 1,000 units tablet   Yes No   Sig: Take 2,000 Units by mouth daily   dexmethylphenidate (FOCALIN XR) 40 MG 24 hr capsule   Yes No   Sig: Take 40 mg by mouth daily   dulaglutide (Trulicity) 4.5 MG/0.5ML injection   Yes No   Sig: INJECT 0.5ML  (4.5MG) INTO THE SKIN EVERY 7 DAYS   insulin aspart, w/niacinamide, (FIASP) 100 Units/mL injection pen   Yes No   Sig: Inject 18 Units under the skin   lamoTRIgine (LaMICtal) 100 mg tablet   Yes No   Sig: Take 150 mg by mouth   levETIRAcetam (KEPPRA) 750 mg tablet   Yes No   Sig: Take 1,500 mg by mouth   levothyroxine 125 mcg tablet   Yes No   Sig: Take 2 tablets (250 mcg) daily   lisinopril (ZESTRIL) 40 mg tablet   Yes No   Sig: Take 40 mg by mouth   omeprazole (PriLOSEC) 40 MG capsule   Yes No   Sig: Take 40 mg by mouth   rOPINIRole (REQUIP) 2 mg tablet   Yes No   Sig: Take 2 mg by mouth   sertraline (ZOLOFT) 100 mg tablet   Yes No   Sig: Take 100 mg by mouth daily   tamsulosin (Flomax) 0.4 mg   Yes No   Sig: Take 0.4 mg by mouth daily with dinner      Facility-Administered Medications: None       Past Medical History:   Diagnosis Date    CKD (chronic kidney disease)     Diabetes mellitus (HCC)     Disease of thyroid gland     High cholesterol     Hypertension     Seizure (HCC)        History reviewed. No pertinent surgical history.    History reviewed. No pertinent family history.  I have reviewed and agree with the history as documented.    E-Cigarette/Vaping    E-Cigarette Use Current Every Day User      E-Cigarette/Vaping Substances    Nicotine Yes      Social History     Tobacco Use    Smoking status: Some Days     Types: Cigars    Smokeless tobacco: Never   Vaping Use    Vaping status: Every Day    Substances: Nicotine   Substance Use Topics    Alcohol use: Not Currently    Drug use: Not Currently     Types: Cocaine       Review of Systems   Constitutional:  Negative for fever.   Respiratory:  Negative for shortness of breath.    Cardiovascular:  Negative for chest pain.   Gastrointestinal:  Negative for abdominal pain.   All other systems reviewed and are negative.      Physical Exam  Physical Exam  Vitals and nursing note reviewed.   Constitutional:       General: He is not in acute distress.      Appearance: He is ill-appearing.      Comments: Pleasant, comfortable-appearing, blunted affect, flat facies, ambulates with mild difficulty, appears imbalanced, able to place self in stretcher   HENT:      Head: Normocephalic and atraumatic.      Mouth/Throat:      Mouth: Mucous membranes are dry.      Pharynx: Oropharynx is clear.   Eyes:      Conjunctiva/sclera: Conjunctivae normal.      Pupils: Pupils are equal, round, and reactive to light.   Cardiovascular:      Rate and Rhythm: Normal rate and regular rhythm.      Heart sounds: Normal heart sounds.   Pulmonary:      Effort: Pulmonary effort is normal. No respiratory distress.      Breath sounds: Normal breath sounds. No wheezing or rales.   Abdominal:      General: Bowel sounds are normal. There is no distension.      Palpations: Abdomen is soft.      Tenderness: There is no abdominal tenderness. There is no right CVA tenderness or left CVA tenderness.   Musculoskeletal:         General: No deformity.      Cervical back: Neck supple. No rigidity.      Right lower leg: Edema present.      Left lower leg: Edema present.   Skin:     General: Skin is warm and dry.      Findings: No bruising or rash.   Neurological:      General: No focal deficit present.      Mental Status: He is alert and oriented to person, place, and time.      Cranial Nerves: No cranial nerve deficit.      Sensory: No sensory deficit.      Motor: No weakness.      Coordination: Coordination normal.      Gait: Gait normal.   Psychiatric:         Behavior: Behavior normal.         Thought Content: Thought content normal.         Judgment: Judgment normal.         Vital Signs  ED Triage Vitals [02/06/24 1553]   Temperature Pulse Respirations Blood Pressure SpO2   98 °F (36.7 °C) 87 20 (!) 169/107 92 %      Temp src Heart Rate Source Patient Position - Orthostatic VS BP Location FiO2 (%)   -- Monitor Sitting Left arm --      Pain Score       --           Vitals:    02/06/24 1645 02/06/24 1700  "02/06/24 1715 02/06/24 1730   BP: (!) 166/101 152/100 (!) 167/106 149/100   Pulse: 84 85 85 79   Patient Position - Orthostatic VS: Lying  Lying Lying         Visual Acuity  Visual Acuity      Flowsheet Row Most Recent Value   L Pupil Size (mm) 4   R Pupil Size (mm) 4            ED Medications  Medications   sodium chloride 0.9 % bolus 1,000 mL (1,000 mL Intravenous New Bag 2/6/24 1706)   insulin regular (HumuLIN R,NovoLIN R) injection 15 Units (15 Units Intravenous Given 2/6/24 1713)       Diagnostic Studies  Results Reviewed       Procedure Component Value Units Date/Time    Fingerstick Glucose (POCT) [[id number]]  (Abnormal) Collected: 02/06/24 1757    Lab Status: Final result Updated: 02/06/24 1758     POC Glucose 419 mg/dl     Osmolality-\"If this is regarding a toxic alcohol, please STOP and consult medical  for further guidance.\" [[id number]]     Lab Status: No result Specimen: Blood     UA w Reflex to Microscopic w Reflex to Culture [[id number]]  (Abnormal) Collected: 02/06/24 1629    Lab Status: Final result Specimen: Urine Updated: 02/06/24 1733     Color, UA Yellow     Clarity, UA Clear     Specific Gravity, UA 1.010     pH, UA 5.5     Leukocytes, UA Elevated glucose may cause decreased leukocyte values. See urine microscopic for UWBC result     Nitrite, UA Negative     Protein, UA Trace mg/dl      Glucose, UA >=1000 (1%) mg/dl      Ketones, UA Negative mg/dl      Urobilinogen, UA 0.2 E.U./dl      Bilirubin, UA Negative     Occult Blood, UA Small    Urine Microscopic [[id number]] Collected: 02/06/24 1629    Lab Status: In process Specimen: Urine Updated: 02/06/24 1733    FLU/RSV/COVID - if FLU/RSV clinically relevant [[id number]]  (Normal) Collected: 02/06/24 1623    Lab Status: Final result Specimen: Nares from Nasopharyngeal Swab Updated: 02/06/24 1724     SARS-CoV-2 Negative     INFLUENZA A PCR Negative     INFLUENZA B PCR Negative     RSV PCR Negative    Narrative:      FOR PEDIATRIC PATIENTS - " copy/paste COVID Guidelines URL to browser: [email or web address]    SARS-CoV-2 assay is a Nucleic Acid Amplification assay intended for the  qualitative detection of nucleic acid from SARS-CoV-2 in nasopharyngeal  swabs. Results are for the presumptive identification of SARS-CoV-2 RNA.    Positive results are indicative of infection with SARS-CoV-2, the virus  causing COVID-19, but do not rule out bacterial infection or co-infection  with other viruses. Laboratories within the [place] and its  territories are required to report all positive results to the appropriate  public health authorities. Negative results do not preclude SARS-CoV-2  infection and should not be used as the sole basis for treatment or other  patient management decisions. Negative results must be combined with  clinical observations, patient history, and epidemiological information.  This test has not been FDA cleared or approved.    This test has been authorized by FDA under an Emergency Use Authorization  (EUA). This test is only authorized for the duration of time the  declaration that circumstances exist justifying the authorization of the  emergency use of an in vitro diagnostic tests for detection of SARS-CoV-2  virus and/or diagnosis of COVID-19 infection under section 564(b)(1) of  the Act, 21 U.S.C. 360bbb-3(b)(1), unless the authorization is terminated  or revoked sooner. The test has been validated but independent review by FDA  and CLIA is pending.    Test performed using [employer] GeneXpert: This RT-PCR assay targets N2,  a region unique to SARS-CoV-2. A conserved region in the E-gene was chosen  for pan-Sarbecovirus detection which includes SARS-CoV-2.    According to CMS-2020-01-R, this platform meets the definition of high-throughput technology.    B-Type Natriuretic Peptide(BNP) [[id number]]  (Normal) Collected: 02/06/24 1627    Lab Status: Final result Specimen: Blood  Updated: 02/06/24 1717     BNP 13 pg/mL     HS Troponin I 2hr [[id number]]     Lab Status: No result Specimen: Blood     HS Troponin 0hr (reflex protocol) [[id number]]  (Normal) Collected: 02/06/24 1627    Lab Status: Final result Specimen: Blood Updated: 02/06/24 1659     hs TnI 0hr 7 ng/L     Lactic acid, plasma (w/reflex if result > 2.0) [[id number]]  (Abnormal) Collected: 02/06/24 1627    Lab Status: Final result Specimen: Blood Updated: 02/06/24 1658     LACTIC ACID 2.3 mmol/L     Narrative:      Result may be elevated if tourniquet was used during collection.    Lactic acid 2 Hours [[id number]]     Lab Status: No result Specimen: Blood     Basic metabolic panel [[id number]]  (Abnormal) Collected: 02/06/24 1627    Lab Status: Final result Specimen: Blood Updated: 02/06/24 1657     Sodium 126 mmol/L      Potassium 4.6 mmol/L      Chloride 88 mmol/L      CO2 26 mmol/L      ANION GAP 12 mmol/L      BUN 27 mg/dL      Creatinine 1.61 mg/dL      Glucose 629 mg/dL      Calcium 10.5 mg/dL      eGFR 46 ml/min/1.73sq m     Narrative:      National Kidney Disease Foundation guidelines for Chronic Kidney Disease (CKD):     Stage 1 with normal or high GFR (GFR > 90 mL/min/1.73 square meters)    Stage 2 Mild CKD (GFR = 60-89 mL/min/1.73 square meters)    Stage 3A Moderate CKD (GFR = 45-59 mL/min/1.73 square meters)    Stage 3B Moderate CKD (GFR = 30-44 mL/min/1.73 square meters)    Stage 4 Severe CKD (GFR = 15-29 mL/min/1.73 square meters)    Stage 5 End Stage CKD (GFR <15 mL/min/1.73 square meters)  Note: GFR calculation is accurate only with a steady state creatinine    Rapid drug screen, urine [[id number]]  (Normal) Collected: 02/06/24 1633    Lab Status: Final result Specimen: Urine, Clean Catch Updated: 02/06/24 1655     Amph/Meth UR Negative     Barbiturate Ur Negative     Benzodiazepine Urine Negative     Cocaine Urine Negative     Methadone Urine Negative     Opiate Urine Negative     PCP Ur Negative     THC Urine Negative      Oxycodone Urine Negative    Narrative:      FOR MEDICAL PURPOSES ONLY.   IF CONFIRMATION NEEDED PLEASE CONTACT THE LAB WITHIN 5 DAYS.    Drug Screen Cutoff Levels:  AMPHETAMINE/METHAMPHETAMINES  1000 ng/mL  BARBITURATES     200 ng/mL  BENZODIAZEPINES     200 ng/mL  COCAINE      300 ng/mL  METHADONE      300 ng/mL  OPIATES      300 ng/mL  PHENCYCLIDINE     25 ng/mL  THC       50 ng/mL  OXYCODONE      100 ng/mL    Ethanol [[id number]]  (Normal) Collected: 02/06/24 1627    Lab Status: Final result Specimen: Blood Updated: 02/06/24 1654     Ethanol Lvl <10 mg/dL     Ammonia [[id number]]  (Normal) Collected: 02/06/24 1627    Lab Status: Final result Specimen: Blood Updated: 02/06/24 1650     Ammonia 63 umol/L     Protime-INR [[id number]]  (Normal) Collected: 02/06/24 1627    Lab Status: Final result Specimen: Blood Updated: 02/06/24 1646     Protime 12.3 seconds      INR 0.89    APTT [[id number]]  (Normal) Collected: 02/06/24 1627    Lab Status: Final result Specimen: Blood Updated: 02/06/24 1646     PTT 28 seconds     Blood gas, venous [[id number]]  (Abnormal) Collected: 02/06/24 1634    Lab Status: Final result Specimen: Blood from Arm, Right Updated: 02/06/24 1641     pH, Reggie 7.427     pCO2, Reggie 38.7 mm Hg      pO2, Reggie 48.2 mm Hg      HCO3, Reggie 24.9 mmol/L      Base Excess, Reggie 0.7 mmol/L      O2 Content, Reggie 16.6 ml/dL      O2 HGB, VENOUS 80.1 %     Beta Hydroxybutyrate [[id number]]  (Normal) Collected: 02/06/24 1627    Lab Status: Final result Specimen: Blood Updated: 02/06/24 1640     BETA-HYDROXYBUTYRATE 0.1 mmol/L     CBC and Platelet [[id number]]  (Abnormal) Collected: 02/06/24 1627    Lab Status: Final result Specimen: Blood Updated: 02/06/24 1639     WBC 8.82 Thousand/uL      RBC 4.53 Million/uL      Hemoglobin 14.4 g/dL      Hematocrit 42.4 %      MCV 94 fL      MCH 31.8 pg      MCHC 34.0 g/dL      RDW 13.9 %      Platelets 150 Thousands/uL      MPV 13.5 fL     Blood culture #1 [[id number]] Collected: 02/06/24  1631    Lab Status: In process Specimen: Blood from Arm, Left Updated: 02/06/24 1635    Blood culture #2 [[id number]] Collected: 02/06/24 1631    Lab Status: In process Specimen: Blood from Arm, Right Updated: 02/06/24 1635    Fingerstick Glucose (POCT) [[id number]]  (Abnormal) Collected: 02/06/24 1552    Lab Status: Final result Updated: 02/06/24 1553     POC Glucose >500 mg/dl                    CT chest abdomen pelvis wo contrast   Final Result by Hanna Bocanegra MD (02/06 1710)      Hepatomegaly and diffuse hepatic steatosis. Liver is diffusely heterogeneous. Evaluation is limited due to the lack of IV contrast and tumor is not excluded. Given the history of cirrhosis, recommend MRI using liver mass protocol.      No evidence of an acute inflammatory process.               Workstation performed: [id number]         CT stroke alert brain   Final Result by Uriel Santa MD (02/06 1635)      -Hypodensities within the left anterior limb of the internal capsule/basal ganglia may represent age-indeterminate infarcts. Differential includes microangiopathic changes among other etiologies. Clinical correlation advised.            I personally communicated the findings via telephone with Shahnaz Rodriguez at approximately 4:32 p.m. on 2/6/2024.      Workstation performed: [id number]         CTA stroke alert (head/neck)    (Results Pending)              Procedures  Procedures  Critical care time 30 minutes not including billable procedures, treating other patients and teaching      ED Course  ED Course as of 02/06/24 1804 Tue Feb 06, 2024   161Bill Neuro Michael aware and will review   1617 CT tech notes patient states he has seizures with iv contrast   1645 EKG 4:37 PM normal sinus rhythm rate 83 normal axis normal intervals no ST elevation or depression interpreted by me   1656 pH, Reggie(!): 7.427   1656 pCO2, Reggie(!): 38.7   1656 ETHANOL: <10   1656 Ammonia: 63   1656 BETA-HYDROXYBUTYRATE: 0.1   1657 WBC: 8.82   1733 LACTIC  ACID(!!): 2.3   1733 hs TnI 0hr: 7   1733 BNP: 13   1734 Sodium(!): 126   1734 BUN(!): 27                  Stroke Assessment       Row Name 02/06/24 1613             NIH Stroke Scale    Interval Baseline      Level of Consciousness (1a.) 0      LOC Questions (1b.) 0      LOC Commands (1c.) 0      Best Gaze (2.) 0      Visual (3.) 0      Facial Palsy (4.) 0      Motor Arm, Left (5a.) 0      Motor Arm, Right (5b.) 0      Motor Leg, Left (6a.) 0      Motor Leg, Right (6b.) 0      Limb Ataxia (7.) 0      Sensory (8.) 0      Best Language (9.) 0      Dysarthria (10.) 1      Extinction and Inattention (11.) (Formerly Neglect) 0      Total 1                                              Medical Decision Making  Amount and/or Complexity of Data Reviewed  Labs: ordered. Decision-making details documented in ED Course.  Radiology: ordered and independent interpretation performed. Decision-making details documented in ED Course.  ECG/medicine tests: ordered and independent interpretation performed. Decision-making details documented in ED Course.    Risk  OTC drugs.  Decision regarding hospitalization.             Disposition  Final diagnoses:   Stroke-like symptoms   Hyperglycemia     Time reflects when diagnosis was documented in both MDM as applicable and the Disposition within this note       Time User Action Codes Description Comment    2/6/2024  4:11 PM Zachery Torres Add [R29.90] Stroke-like symptoms     2/6/2024  5:38 PM Zachery Torres Add [R73.9] Hyperglycemia           ED Disposition       ED Disposition   Admit    Condition   Stable    Date/Time   Tue Feb 6, 2024  6:04 PM    Comment   Case was discussed with Krissy and the patient's admission status was agreed to be Admission Status: inpatient status to the service of Dr. Rey.               Follow-up Information    None         Patient's Medications   Discharge Prescriptions    No medications on file       No discharge procedures on file.    PDMP Review        None            ED Provider  Electronically Signed by             Zachery Torres DO  02/06/24 2089

## 2024-02-06 NOTE — ASSESSMENT & PLAN NOTE
Presents to ED with slurred speech and taking longer time to answer questions per patient's friend. Feels dizzy and tired since waking up.   NIH in ED 1, NIH now = 1; symptoms of dysarthria  CT scan Hypodensities within the left anterior limb of the internal capsule/basal ganglia may represent age-indeterminate infarcts. Differential includes microangiopathic changes among other etiologies. Clinical correlation advised.   CTA head/neck pending  MRI ordered   Stroke pathway  Permissive hypertension if s/s <24 hours   DVT prophylaxis   PT/OT/ST  Statin  Lipid panel/TSH/Hgb A1C ordered   Telemetry, neuro checks   Neurology consult; recommendations appreciated

## 2024-02-06 NOTE — ASSESSMENT & PLAN NOTE
History of HTN on novrasc and lisinopril  Currently with elevated creatinine, hold lisinopril and hold norvasc in light of stroke like symptoms  Allow for permissive HTN at this time  Avoid hypotension

## 2024-02-06 NOTE — ASSESSMENT & PLAN NOTE
Lab Results   Component Value Date    HGBA1C 8.5 (H) 10/11/2022     Presents to ED with slurred speech and taking longer to answer questions per patient's friend. Patient's home diabetic regimen includes jardiance, trulicity, novolog 18 units, and lantus 50 units - appears outpatient endo office has been telling patient to come to the ED due to sugars being high and patient was out of meds for some time.   Glucose in the ED on   Anion gap: 12  Betahydroxybutyrate 0.1  Lactic acid 2.3  Urine ketones: negative  Potassium: 4.6  Sodium: 126 (corrected to 139)  Phosphate: ordered  WBC normal  Strict I & Os  Aggressive IV hydration  Insulin drip and IVF rehydration started, continue to monitor sugars

## 2024-02-06 NOTE — ASSESSMENT & PLAN NOTE
With history of seizures, currently following with neuro outpatient  Continue home seizure medications  Keppra level ordered

## 2024-02-07 ENCOUNTER — APPOINTMENT (INPATIENT)
Dept: MRI IMAGING | Facility: HOSPITAL | Age: 59
DRG: 682 | End: 2024-02-07
Payer: MEDICARE

## 2024-02-07 LAB
ANION GAP SERPL CALCULATED.3IONS-SCNC: 7 MMOL/L
ATRIAL RATE: 82 BPM
BASOPHILS # BLD AUTO: 0.06 THOUSANDS/ÂΜL (ref 0–0.1)
BASOPHILS NFR BLD AUTO: 1 % (ref 0–1)
BUN SERPL-MCNC: 27 MG/DL (ref 5–25)
CALCIUM SERPL-MCNC: 9.4 MG/DL (ref 8.4–10.2)
CHLORIDE SERPL-SCNC: 100 MMOL/L (ref 96–108)
CHOLEST SERPL-MCNC: 408 MG/DL
CO2 SERPL-SCNC: 27 MMOL/L (ref 21–32)
CREAT SERPL-MCNC: 1.24 MG/DL (ref 0.6–1.3)
EOSINOPHIL # BLD AUTO: 0.11 THOUSAND/ÂΜL (ref 0–0.61)
EOSINOPHIL NFR BLD AUTO: 2 % (ref 0–6)
ERYTHROCYTE [DISTWIDTH] IN BLOOD BY AUTOMATED COUNT: 14.1 % (ref 11.6–15.1)
EST. AVERAGE GLUCOSE BLD GHB EST-MCNC: 324 MG/DL
GFR SERPL CREATININE-BSD FRML MDRD: 63 ML/MIN/1.73SQ M
GLUCOSE SERPL-MCNC: 135 MG/DL (ref 65–140)
GLUCOSE SERPL-MCNC: 139 MG/DL (ref 65–140)
GLUCOSE SERPL-MCNC: 159 MG/DL (ref 65–140)
GLUCOSE SERPL-MCNC: 167 MG/DL (ref 65–140)
GLUCOSE SERPL-MCNC: 175 MG/DL (ref 65–140)
GLUCOSE SERPL-MCNC: 190 MG/DL (ref 65–140)
GLUCOSE SERPL-MCNC: 195 MG/DL (ref 65–140)
GLUCOSE SERPL-MCNC: 198 MG/DL (ref 65–140)
GLUCOSE SERPL-MCNC: 210 MG/DL (ref 65–140)
GLUCOSE SERPL-MCNC: 217 MG/DL (ref 65–140)
GLUCOSE SERPL-MCNC: 225 MG/DL (ref 65–140)
GLUCOSE SERPL-MCNC: 242 MG/DL (ref 65–140)
GLUCOSE SERPL-MCNC: 323 MG/DL (ref 65–140)
HAV IGM SER QL: NORMAL
HBA1C MFR BLD: 12.9 %
HBV CORE IGM SER QL: NORMAL
HBV SURFACE AG SER QL: NORMAL
HCT VFR BLD AUTO: 35.9 % (ref 36.5–49.3)
HCV AB SER QL: NORMAL
HDLC SERPL-MCNC: 35 MG/DL
HGB BLD-MCNC: 12.2 G/DL (ref 12–17)
IMM GRANULOCYTES # BLD AUTO: 0.04 THOUSAND/UL (ref 0–0.2)
IMM GRANULOCYTES NFR BLD AUTO: 1 % (ref 0–2)
LDLC SERPL DIRECT ASSAY-MCNC: 153 MG/DL (ref 0–100)
LYMPHOCYTES # BLD AUTO: 3.14 THOUSANDS/ÂΜL (ref 0.6–4.47)
LYMPHOCYTES NFR BLD AUTO: 46 % (ref 14–44)
MAGNESIUM SERPL-MCNC: 2 MG/DL (ref 1.9–2.7)
MCH RBC QN AUTO: 31.9 PG (ref 26.8–34.3)
MCHC RBC AUTO-ENTMCNC: 34 G/DL (ref 31.4–37.4)
MCV RBC AUTO: 94 FL (ref 82–98)
MONOCYTES # BLD AUTO: 0.34 THOUSAND/ÂΜL (ref 0.17–1.22)
MONOCYTES NFR BLD AUTO: 5 % (ref 4–12)
NEUTROPHILS # BLD AUTO: 3.2 THOUSANDS/ÂΜL (ref 1.85–7.62)
NEUTS SEG NFR BLD AUTO: 45 % (ref 43–75)
NRBC BLD AUTO-RTO: 0 /100 WBCS
P AXIS: 69 DEGREES
PHOSPHATE SERPL-MCNC: 4.1 MG/DL (ref 2.7–4.5)
PLATELET # BLD AUTO: 114 THOUSANDS/UL (ref 149–390)
PMV BLD AUTO: 12.8 FL (ref 8.9–12.7)
POTASSIUM SERPL-SCNC: 3.6 MMOL/L (ref 3.5–5.3)
PR INTERVAL: 164 MS
PROCALCITONIN SERPL-MCNC: 0.19 NG/ML
QRS AXIS: 16 DEGREES
QRSD INTERVAL: 92 MS
QT INTERVAL: 362 MS
QTC INTERVAL: 422 MS
RBC # BLD AUTO: 3.82 MILLION/UL (ref 3.88–5.62)
SODIUM SERPL-SCNC: 134 MMOL/L (ref 135–147)
T WAVE AXIS: 66 DEGREES
T4 FREE SERPL-MCNC: 0.73 NG/DL (ref 0.61–1.12)
TRIGL SERPL-MCNC: 939 MG/DL
VENTRICULAR RATE: 82 BPM
WBC # BLD AUTO: 6.89 THOUSAND/UL (ref 4.31–10.16)

## 2024-02-07 PROCEDURE — 84100 ASSAY OF PHOSPHORUS: CPT

## 2024-02-07 PROCEDURE — 85025 COMPLETE CBC W/AUTO DIFF WBC: CPT | Performed by: FAMILY MEDICINE

## 2024-02-07 PROCEDURE — 94760 N-INVAS EAR/PLS OXIMETRY 1: CPT

## 2024-02-07 PROCEDURE — 94640 AIRWAY INHALATION TREATMENT: CPT

## 2024-02-07 PROCEDURE — 87070 CULTURE OTHR SPECIMN AEROBIC: CPT

## 2024-02-07 PROCEDURE — 83721 ASSAY OF BLOOD LIPOPROTEIN: CPT | Performed by: FAMILY MEDICINE

## 2024-02-07 PROCEDURE — 80048 BASIC METABOLIC PNL TOTAL CA: CPT | Performed by: FAMILY MEDICINE

## 2024-02-07 PROCEDURE — 84145 PROCALCITONIN (PCT): CPT

## 2024-02-07 PROCEDURE — 99232 SBSQ HOSP IP/OBS MODERATE 35: CPT

## 2024-02-07 PROCEDURE — 82948 REAGENT STRIP/BLOOD GLUCOSE: CPT

## 2024-02-07 PROCEDURE — 87205 SMEAR GRAM STAIN: CPT

## 2024-02-07 PROCEDURE — 83735 ASSAY OF MAGNESIUM: CPT

## 2024-02-07 PROCEDURE — 70551 MRI BRAIN STEM W/O DYE: CPT

## 2024-02-07 PROCEDURE — 97167 OT EVAL HIGH COMPLEX 60 MIN: CPT

## 2024-02-07 PROCEDURE — 97163 PT EVAL HIGH COMPLEX 45 MIN: CPT

## 2024-02-07 PROCEDURE — 83036 HEMOGLOBIN GLYCOSYLATED A1C: CPT | Performed by: FAMILY MEDICINE

## 2024-02-07 PROCEDURE — 94664 DEMO&/EVAL PT USE INHALER: CPT

## 2024-02-07 PROCEDURE — 80061 LIPID PANEL: CPT | Performed by: FAMILY MEDICINE

## 2024-02-07 RX ORDER — IPRATROPIUM BROMIDE AND ALBUTEROL SULFATE 2.5; .5 MG/3ML; MG/3ML
3 SOLUTION RESPIRATORY (INHALATION)
Status: DISCONTINUED | OUTPATIENT
Start: 2024-02-07 | End: 2024-02-08 | Stop reason: HOSPADM

## 2024-02-07 RX ORDER — INSULIN GLARGINE 100 [IU]/ML
55 INJECTION, SOLUTION SUBCUTANEOUS
Status: DISCONTINUED | OUTPATIENT
Start: 2024-02-07 | End: 2024-02-08 | Stop reason: HOSPADM

## 2024-02-07 RX ORDER — INSULIN LISPRO 100 [IU]/ML
2-12 INJECTION, SOLUTION INTRAVENOUS; SUBCUTANEOUS
Status: DISCONTINUED | OUTPATIENT
Start: 2024-02-07 | End: 2024-02-08 | Stop reason: HOSPADM

## 2024-02-07 RX ORDER — INSULIN LISPRO 100 [IU]/ML
4-20 INJECTION, SOLUTION INTRAVENOUS; SUBCUTANEOUS
Status: DISCONTINUED | OUTPATIENT
Start: 2024-02-07 | End: 2024-02-08 | Stop reason: HOSPADM

## 2024-02-07 RX ORDER — INSULIN LISPRO 100 [IU]/ML
18 INJECTION, SOLUTION INTRAVENOUS; SUBCUTANEOUS
Status: DISCONTINUED | OUTPATIENT
Start: 2024-02-07 | End: 2024-02-08 | Stop reason: HOSPADM

## 2024-02-07 RX ADMIN — SERTRALINE HYDROCHLORIDE 150 MG: 50 TABLET ORAL at 09:29

## 2024-02-07 RX ADMIN — LAMOTRIGINE 200 MG: 100 TABLET ORAL at 09:29

## 2024-02-07 RX ADMIN — SODIUM CHLORIDE 125 ML/HR: 0.9 INJECTION, SOLUTION INTRAVENOUS at 14:05

## 2024-02-07 RX ADMIN — IPRATROPIUM BROMIDE AND ALBUTEROL SULFATE 3 ML: 2.5; .5 SOLUTION RESPIRATORY (INHALATION) at 02:52

## 2024-02-07 RX ADMIN — NICOTINE 1 PATCH: 14 PATCH, EXTENDED RELEASE TRANSDERMAL at 09:29

## 2024-02-07 RX ADMIN — LEVETIRACETAM 1500 MG: 500 TABLET, FILM COATED ORAL at 09:29

## 2024-02-07 RX ADMIN — INSULIN LISPRO 18 UNITS: 100 INJECTION, SOLUTION INTRAVENOUS; SUBCUTANEOUS at 16:52

## 2024-02-07 RX ADMIN — SODIUM CHLORIDE 8 UNITS/HR: 9 INJECTION, SOLUTION INTRAVENOUS at 15:07

## 2024-02-07 RX ADMIN — PANTOPRAZOLE SODIUM 40 MG: 40 TABLET, DELAYED RELEASE ORAL at 05:30

## 2024-02-07 RX ADMIN — LEVOTHYROXINE SODIUM 250 MCG: 125 TABLET ORAL at 05:30

## 2024-02-07 RX ADMIN — SODIUM CHLORIDE 125 ML/HR: 0.9 INJECTION, SOLUTION INTRAVENOUS at 05:26

## 2024-02-07 RX ADMIN — INSULIN LISPRO 4 UNITS: 100 INJECTION, SOLUTION INTRAVENOUS; SUBCUTANEOUS at 16:52

## 2024-02-07 RX ADMIN — SODIUM CHLORIDE 6 UNITS/HR: 9 INJECTION, SOLUTION INTRAVENOUS at 05:27

## 2024-02-07 RX ADMIN — AZITHROMYCIN 500 MG: 250 TABLET, FILM COATED ORAL at 18:17

## 2024-02-07 RX ADMIN — LEVETIRACETAM 1500 MG: 500 TABLET, FILM COATED ORAL at 21:03

## 2024-02-07 RX ADMIN — HEPARIN SODIUM 5000 UNITS: 5000 INJECTION INTRAVENOUS; SUBCUTANEOUS at 21:03

## 2024-02-07 RX ADMIN — DICYCLOMINE HYDROCHLORIDE 20 MG: 20 TABLET ORAL at 21:03

## 2024-02-07 RX ADMIN — FOLIC ACID 1 MG: 1 TABLET ORAL at 09:29

## 2024-02-07 RX ADMIN — HEPARIN SODIUM 5000 UNITS: 5000 INJECTION INTRAVENOUS; SUBCUTANEOUS at 15:08

## 2024-02-07 RX ADMIN — ATORVASTATIN CALCIUM 40 MG: 40 TABLET, FILM COATED ORAL at 18:17

## 2024-02-07 RX ADMIN — TAMSULOSIN HYDROCHLORIDE 0.4 MG: 0.4 CAPSULE ORAL at 16:01

## 2024-02-07 RX ADMIN — HEPARIN SODIUM 5000 UNITS: 5000 INJECTION INTRAVENOUS; SUBCUTANEOUS at 05:30

## 2024-02-07 RX ADMIN — ROPINIROLE 2 MG: 1 TABLET, FILM COATED ORAL at 21:03

## 2024-02-07 RX ADMIN — INSULIN GLARGINE 55 UNITS: 100 INJECTION, SOLUTION SUBCUTANEOUS at 23:02

## 2024-02-07 NOTE — ASSESSMENT & PLAN NOTE
Patient currently taking synthroid 250 mcg outpatient, outpatient endo recommending coming to ED for evaluation due to elevated sugars and severe hypothryoidism  TSH elevated, T4 normal  Endo consulted

## 2024-02-07 NOTE — DISCHARGE INSTR - DIET
Suggest continue CCD2 cardiac diet p d/c, also out patient follow up for diabetic diet/MD order, PT needs to establish diet counselor for long term  glucose control.

## 2024-02-07 NOTE — CONSULTS
TeleConsultation - Endocrine  Alli Burt Sr. 58 y.o. male MRN: [id number]  Unit/Bed#: -01 Encounter: [id number]        REQUIRED DOCUMENTATION:     1. This service was provided via Telemedicine.  2. Provider located at [place], [place].  3. TeleMed provider: Alon Beckman DO.  4. Identify all parties in room with patient during tele consult:  Myself and patient  5.Patient was then informed that this was a Telemedicine visit and that the exam was being conducted confidentially over secure lines. My office door was closed. No one else was in the room.  Patient acknowledged consent and understanding of privacy and security of the Telemedicine visit, and gave us permission to have the assistant stay in the room in order to assist with the history and to conduct the exam.  I informed the patient that I have reviewed their record in Epic and presented the opportunity for them to ask any questions regarding the visit today.  The patient agreed to participate.           Assessment/Plan     Assessment:    Type 2 diabetes, uncontrolled  Hypothyroidism, uncontrolled  Encephalopathy  Alcoholic cirrhosis    Plan:    Patient is appropriate for transition from insulin drip. Recommend lantus 55 units qHS, humalog 18 units qAC plus scale #5/4 AC/HS. Orders placed. Continue carb consistent diet. Will continue to follow and will update recommendations as appropriate. Please ensure patient has adequate supply of scripts, including thyroid medication. May continue present Rx LT4 and should have thyroid labs checked in 4-6 weeks for close monitoring. He has a primary endocrinologist in [place] whom he should follow for repeat testing.     History of Present Illness     HPI: Alli Burt Sr. is a 58 y.o. year old male who presented to [facility] 2/6/24 with slurred speech and encephalopathy. Had been requested ED eval by outpatient endo due to abnormal labs. Had not been taking medications for past month due to lack of  "supplies. He reports having intense thirst, and was drinking 8-10 cups of water daily, and a gallon of milk a day.     Outpatient diabetes regimen included lantus 50 units HS, novolog 18 units qAC, trulicity and jardiance.     Reports >5 year history of diabetes. Has complications including CKD and liver disease. His primary endocrinologist is in [place].     Patient had also been Rx levothyroxine 250 mcg daily. He was off therapy for several weeks prior to resuming 7-days ago.     Inpatient consult to Endocrinology  Consult performed by: Alon [facility], DO  Consult ordered by: Shital Rey MD        Review of Systems   Constitutional:  Negative for diaphoresis and unexpected weight change.   Endocrine: Positive for polydipsia and polyuria.   Psychiatric/Behavioral:          Groggy   All other systems reviewed and are negative.      Historical Information   Past Medical History:   Diagnosis Date    CKD (chronic kidney disease)     Diabetes mellitus (HCC)     Disease of thyroid gland     High cholesterol     Hypertension 2/6/2024    Seizure (HCC) 2/6/2024     History reviewed. No pertinent surgical history.  Social History   Social History     Substance and Sexual Activity   Alcohol Use Not Currently     Social History     Substance and Sexual Activity   Drug Use Not Currently    Types: Cocaine     E-Cigarette/Vaping    E-Cigarette Use Current Every Day User      E-Cigarette/Vaping Substances    Nicotine Yes      Social History     Tobacco Use   Smoking Status Some Days    Types: Cigars   Smokeless Tobacco Never     Family History: History reviewed. No pertinent family history.    Meds/Allergies   all current active meds have been reviewed  Allergies   Allergen Reactions    Nitroglycerin Seizures       Objective   Vitals: Blood pressure 161/81, pulse 79, temperature (!) 97.2 °F (36.2 °C), temperature source Temporal, resp. rate 16, height 5' 8\" (1.727 m), weight 110 kg (243 lb 3.2 oz), SpO2 " 94%.    Intake/Output Summary (Last 24 hours) at 2/7/2024 1600  Last data filed at 2/7/2024 1301  Gross per 24 hour   Intake 4004.73 ml   Output 600 ml   Net 3404.73 ml     Invasive Devices       Peripheral Intravenous Line  Duration             Peripheral IV 02/06/24 Right Antecubital 1 day    Peripheral IV 02/06/24 Left Antecubital <1 day                    Physical Exam  Vitals reviewed.   Constitutional:       General: He is not in acute distress.     Appearance: Normal appearance.   HENT:      Head: Normocephalic and atraumatic.      Nose: Nose normal.   Eyes:      General: No scleral icterus.     Conjunctiva/sclera: Conjunctivae normal.   Pulmonary:      Effort: Pulmonary effort is normal. No respiratory distress.   Neurological:      General: No focal deficit present.      Mental Status: He is alert.      Comments: Fluent speech   Psychiatric:         Mood and Affect: Mood normal.         Behavior: Behavior normal.         Lab Results: I have personally reviewed pertinent reports.      Component      Latest Ref Rng 2/6/2024   Sodium      135 - 147 mmol/L 126 (L)    Potassium      3.5 - 5.3 mmol/L 4.6    Chloride      96 - 108 mmol/L 88 (L)    Carbon Dioxide      21 - 32 mmol/L 26    ANION GAP      mmol/L 12    BUN      5 - 25 mg/dL 27 (H)    Creatinine      0.60 - 1.30 mg/dL 1.61 (H)    GLUCOSE      65 - 140 mg/dL 629 (HH)    Calcium      8.4 - 10.2 mg/dL 10.5 (H)    GFR, Calculated      ml/min/1.73sq m 46    pH, Reggie      7.300 - 7.400  7.427 (H)    pCO2, Reggie      42.0 - 50.0 mm Hg 38.7 (L)    pO2, Reggie      35.0 - 45.0 mm Hg 48.2 (H)    HCO3, Reggie      24 - 30 mmol/L 24.9    Base Excess, Reggie      mmol/L 0.7    O2 Content, Reggie      ml/dL 16.6    O2 HGB, VENOUS      60.0 - 80.0 % 80.1 (H)    BETA-HYDROXYBUTYRATE      <0.6 mmol/L 0.1    Total CK      39 - 308 U/L 657 (H)    TSH 3RD GENERATON      0.450 - 4.500 uIU/mL 69.699 (H)    Free T4      0.61 - 1.12 ng/dL 0.73       Legend:  (L) Low  (H) High  (HH) High  Panic    Imaging Studies: I have personally reviewed pertinent reports.      Counseling / Coordination of Care  I have spent a total time of 25 minutes on 02/07/24 in caring for this patient including Diagnostic results, Instructions for management, Impressions, Counseling / Coordination of care, Documenting in the medical record, Reviewing / ordering tests, medicine, procedures  , and Obtaining or reviewing history  .

## 2024-02-07 NOTE — ASSESSMENT & PLAN NOTE
With history of alcoholic cirrhosis  Ethanol normal on admission  UDS negative  LFTs ordered  Ammonia normal  CT CAP: Hepatomegaly and diffuse hepatic steatosis. Liver is diffusely heterogeneous. Evaluation is limited due to the lack of IV contrast and tumor is not excluded. Given the history of cirrhosis, recommend MRI using liver mass protocol. No evidence of an acute inflammatory process.  Continue to monitor and outpatient follow up.   Discussed with patient regarding needing outpatient MRI, verbalized understanding.

## 2024-02-07 NOTE — PROGRESS NOTES
[facility]  Progress Note  Name: Alli Hernandez I  MRN: [id number]  Unit/Bed#: -01 I Date of Admission: 2/6/2024   Date of Service: 2/7/2024 I Hospital Day: 1    Assessment/Plan   * Stroke-like symptoms  Assessment & Plan  Presents to ED with slurred speech and taking longer time to answer questions per patient's friend. Feels dizzy and tired since waking up.   NIH in ED 1, NIH now = 1; symptoms of dysarthria  CT scan Hypodensities within the left anterior limb of the internal capsule/basal ganglia may represent age-indeterminate infarcts. Differential includes microangiopathic changes among other etiologies. Clinical correlation advised.   CTA head/neck not done.   MRI brain: No acute infarction, edema, or mass effect. Chronic lacunar infarct involving the anterior limb of the left internal capsule chronic to the CT hypodensity. Few punctate hyperintense T2/FLAIR foci are noted within the periventricular and subcortical white matter which are nonspecific and can be seen with vasculitis, migrainous angiopathy, or precocious microangiopathic changes.   Stroke pathway  Permissive hypertension if s/s <24 hours   DVT prophylaxis   PT/OT/ST  Statin  Lipid pane with elevated cholesterol, triglycerides 939, LDL: 153, TSH: 69.699, A1c: 12.9  Telemetry, neuro checks   Neurology consult: MRI brain, If negative, then addressing other comorbid conditions as per primary team.   No stroke    Type 2 diabetes mellitus with hyperosmolar hyperglycemic state (HHS) (HCC)  Assessment & Plan    Lab Results   Component Value Date    HGBA1C 12.9 (H) 02/07/2024     Presents to ED with slurred speech and taking longer to answer questions per patient's friend. Patient's home diabetic regimen includes jardiance, trulicity, novolog 18 units, and lantus 50 units - appears outpatient endo office has been telling patient to come to the ED due to sugars being high and patient was out of meds for some time.    Glucose in the ED on   Anion gap: 12  Betahydroxybutyrate 0.1  Lactic acid 2.3  Urine ketones: negative  Potassium: 4.6  Sodium: 126 (corrected to 139)  Phosphate normal  WBC normal  Strict I & Os  Aggressive IV hydration  Insulin drip and IVF rehydration started, continue to monitor sugars  Endo consulted    Acute metabolic encephalopathy  Assessment & Plan  Suspected due to hyperglycemia in setting of sugars >500. Stroke workup was initiated. Infectious workup being done  UA without evidence of infection  Mildly elevated lactic acid  Blood cultures ordered  Ethanol negative  UDS negative  No leukocytosis  Procal normal  Control sugars and monitor symptoms    JOHN (acute kidney injury) (HCC)  Assessment & Plan  Creatine on admission 1.61, Baseline creatinine 1.1  Suspect secondary to dehydration   Hold ACE/ARB and diuretic therapy  Avoid hypotension, nephrotoxins, and NSAIDS if possible    IV fluids   Strict I & Os  Urinary retention protocol and bladder scan    Chronic obstructive pulmonary disease with acute exacerbation (HCC)  Assessment & Plan  Patient presented to ED with slurred speech and delayed responses  Outpatient medications: dulera prn  Baseline oxygen use: none  CT CAP: No evidence of an acute inflammatory process.   Sputum cultures ordered  Respiratory protocol    Steroids not ordered due to patient currently being in HHS, will do duo-nebs  Azithromycin for exacerbation    Pseudohyponatremia  Assessment & Plan  Low sodium in light of hyperglycemia. Sodium on admission 126 with glucose of 629  Sodium corrects to 139  Continue to monitor patient's sodium while correcting sugars    Other specified hypothyroidism  Assessment & Plan  Patient currently taking synthroid 250 mcg outpatient, outpatient endo recommending coming to ED for evaluation due to elevated sugars and severe hypothryoidism  TSH elevated, T4 normal  Endo consulted     Alcoholic cirrhosis (HCC)  Assessment & Plan  With history of  alcoholic cirrhosis  Ethanol normal on admission  UDS negative  LFTs ordered  Ammonia normal  CT CAP: Hepatomegaly and diffuse hepatic steatosis. Liver is diffusely heterogeneous. Evaluation is limited due to the lack of IV contrast and tumor is not excluded. Given the history of cirrhosis, recommend MRI using liver mass protocol. No evidence of an acute inflammatory process.  Continue to monitor and outpatient follow up.   Discussed with patient regarding needing outpatient MRI, verbalized understanding.     Hypertension  Assessment & Plan  History of HTN on novrasc and lisinopril  Currently with elevated creatinine, hold lisinopril and hold norvasc in light of stroke like symptoms  Allow for permissive HTN at this time  Avoid hypotension    Seizure (HCC)  Assessment & Plan  With history of seizures, currently following with neuro outpatient  Continue home seizure medications  Keppra level ordered           VTE Pharmacologic Prophylaxis: VTE Score: 3 Moderate Risk (Score 3-4) - Pharmacological DVT Prophylaxis Ordered: heparin.    Mobility:   Basic Mobility Inpatient Raw Score: 23  JH-HLM Goal: 7: Walk 25 feet or more  JH-HLM Achieved: 7: Walk 25 feet or more  HLM Goal achieved. Continue to encourage appropriate mobility.    Patient Centered Rounds: I performed bedside rounds with nursing staff today.   Discussions with Specialists or Other Care Team Provider: nursing, case management, endo    Education and Discussions with Family / Patient: Updated  (friend) via phone.    Total Time Spent on Date of Encounter in care of patient: 38 mins. This time was spent on one or more of the following: performing physical exam; counseling and coordination of care; obtaining or reviewing history; documenting in the medical record; reviewing/ordering tests, medications or procedures; communicating with other healthcare professionals and discussing with patient's family/caregivers.    Current Length of Stay: 1  day(s)  Current Patient Status: Inpatient   Certification Statement: The patient will continue to require additional inpatient hospital stay due to HHS, transition to subq insulin off insulin gtt  Discharge Plan: Anticipate discharge tomorrow to home.    Code Status: Level 1 - Full Code    Subjective:   Patient seen and examined today. He is doing well. Said he feels overall weak, but better than when he came into the hospital. No CP, SOB, fever, chills, nausea, vomiting, diarrhea, constipation, abdominal pain. No confusion or slurred speech. Is upset that he will have to stay to get off the insulin gtt. Feels his breathing is better.     Objective:     Vitals:   Temp (24hrs), Av.2 °F (36.8 °C), Min:97.7 °F (36.5 °C), Max:98.5 °F (36.9 °C)    Temp:  [97.7 °F (36.5 °C)-98.5 °F (36.9 °C)] 97.7 °F (36.5 °C)  HR:  [68-99] 79  Resp:  [12-23] 15  BP: ()/() 148/86  SpO2:  [92 %-100 %] 94 %  Body mass index is 36.98 kg/m².     Input and Output Summary (last 24 hours):     Intake/Output Summary (Last 24 hours) at 2024 1411  Last data filed at 2024 1301  Gross per 24 hour   Intake 4004.73 ml   Output 600 ml   Net 3404.73 ml       Physical Exam:   Physical Exam  Vitals reviewed.   Constitutional:       General: He is not in acute distress.     Appearance: He is obese. He is not ill-appearing or toxic-appearing.   HENT:      Head: Normocephalic and atraumatic.      Mouth/Throat:      Mouth: Mucous membranes are moist.   Cardiovascular:      Rate and Rhythm: Normal rate and regular rhythm.      Heart sounds: No murmur heard.  Pulmonary:      Effort: No respiratory distress.      Breath sounds: No stridor. No wheezing, rhonchi or rales.   Abdominal:      General: Bowel sounds are normal. There is no distension.      Palpations: Abdomen is soft. There is no mass.      Tenderness: There is no abdominal tenderness.   Musculoskeletal:      Right lower leg: No edema.      Left lower leg: No edema.   Skin:      General: Skin is warm and dry.   Neurological:      Mental Status: He is alert and oriented to person, place, and time.   Psychiatric:         Mood and Affect: Mood normal.         Behavior: Behavior normal.          Additional Data:     Labs:  Results from last 7 days   Lab Units 02/07/24  0540   WBC Thousand/uL 6.89   HEMOGLOBIN g/dL 12.2   HEMATOCRIT % 35.9*   PLATELETS Thousands/uL 114*   NEUTROS PCT % 45   LYMPHS PCT % 46*   MONOS PCT % 5   EOS PCT % 2     Results from last 7 days   Lab Units 02/07/24  0540 02/06/24  1627   SODIUM mmol/L 134* 126*   POTASSIUM mmol/L 3.6 4.6   CHLORIDE mmol/L 100 88*   CO2 mmol/L 27 26   BUN mg/dL 27* 27*   CREATININE mg/dL 1.24 1.61*   ANION GAP mmol/L 7 12   CALCIUM mg/dL 9.4 10.5*   ALBUMIN g/dL  --  5.0   TOTAL BILIRUBIN mg/dL  --  0.74   ALK PHOS U/L  --  139*   ALT U/L  --  256*   AST U/L  --  147*   GLUCOSE RANDOM mg/dL 190* 629*     Results from last 7 days   Lab Units 02/06/24  1627   INR  0.89     Results from last 7 days   Lab Units 02/07/24  1400 02/07/24  1201 02/07/24  1000 02/07/24  0801 02/07/24  0556 02/07/24  0356 02/07/24  0154 02/07/24  0000 02/06/24  2154 02/06/24  1942 02/06/24  1757 02/06/24  1552   POC GLUCOSE mg/dl 217* 159* 210* 198* 167* 195* 242* 323* 341* 395* 419* >500*     Results from last 7 days   Lab Units 02/07/24  0540   HEMOGLOBIN A1C % 12.9*     Results from last 7 days   Lab Units 02/07/24  0540 02/06/24  2311 02/06/24  2038 02/06/24  1926 02/06/24  1834 02/06/24  1627   LACTIC ACID mmol/L  --  1.7 2.2*  --  2.9* 2.3*   PROCALCITONIN ng/ml 0.19  --   --  0.24  --   --        Lines/Drains:  Invasive Devices       Peripheral Intravenous Line  Duration             Peripheral IV 02/06/24 Left Antecubital <1 day    Peripheral IV 02/06/24 Right Antecubital <1 day                          Imaging: Reviewed radiology reports from this admission including: MRI brain    Recent Cultures (last 7 days):   Results from last 7 days   Lab Units  02/07/24  0554 02/06/24  1631   BLOOD CULTURE   --  Received in Microbiology Lab. Culture in Progress.  Received in Microbiology Lab. Culture in Progress.   GRAM STAIN RESULT  1+ Epithelial cells per low power field*  1+ Polys*  2+ Gram positive cocci in pairs and chains*  2+ Gram negative rods*  1+ Gram Positive Rods Resembling Diphtheroids*  --        Last 24 Hours Medication List:   Current Facility-Administered Medications   Medication Dose Route Frequency Provider Last Rate    acetaminophen  650 mg Oral Q4H PRN Shital Rye MD      aspirin  81 mg Oral Daily Shital Rey MD      atorvastatin  40 mg Oral QPM Shital Rey MD      azithromycin  500 mg Oral Q24H Ashley Stevens PA-C      dicyclomine  20 mg Oral HS Ashley Stevens PA-C      folic acid  1 mg Oral Daily Ashley Stevens PA-C      heparin (porcine)  5,000 Units Subcutaneous Q8H DELFINA Shital Rey MD      insulin regular (HumuLIN R,NovoLIN R) 1 Units/mL in sodium chloride 0.9 % 100 mL infusion  0.3-21 Units/hr Intravenous Titrated Shital Rey MD 8 Units/hr (02/07/24 1405)    ipratropium-albuterol  3 mL Nebulization Q6H PRN Max 4/day Shital Rey MD      lamoTRIgine  200 mg Oral Daily Ashley Stevens PA-C      levETIRAcetam  1,500 mg Oral Q12H DELFINA Ashley Stevens PA-C      levothyroxine  250 mcg Oral Early Morning Ashley Stevens PA-C      melatonin  3 mg Oral HS Ashley Stevens PA-C      nicotine  1 patch Transdermal Daily Shital Rey MD      pantoprazole  40 mg Oral Early Morning Ashley Stevens PA-C      rOPINIRole  0.5 mg Oral HS PRN Ashley Stevens PA-C      rOPINIRole  2 mg Oral HS Ashley Stevens PA-C      sertraline  150 mg Oral Daily Ashley Stevens PA-C      sodium chloride  125 mL/hr Intravenous Continuous Shital Rey  mL/hr (02/07/24 1405)    tamsulosin  0.4 mg Oral Daily With Dinner Ashley Stevens PA-C          Today, Patient Was Seen By: Ashley Block  TYLER Stevens    **Please Note: This note may have been constructed using a voice recognition system.**

## 2024-02-07 NOTE — PLAN OF CARE
Problem: PAIN - ADULT  Goal: Verbalizes/displays adequate comfort level or baseline comfort level  Description: Interventions:  - Encourage patient to monitor pain and request assistance  - Assess pain using appropriate pain scale  - Administer analgesics based on type and severity of pain and evaluate response  - Implement non-pharmacological measures as appropriate and evaluate response  - Consider cultural and social influences on pain and pain management  - Notify physician/advanced practitioner if interventions unsuccessful or patient reports new pain  Outcome: Progressing     Problem: INFECTION - ADULT  Goal: Absence or prevention of progression during hospitalization  Description: INTERVENTIONS:  - Assess and monitor for signs and symptoms of infection  - Monitor lab/diagnostic results  - Monitor all insertion sites, i.e. indwelling lines, tubes, and drains  - Monitor endotracheal if appropriate and nasal secretions for changes in amount and color  - [place] appropriate cooling/warming therapies per order  - Administer medications as ordered  - Instruct and encourage patient and family to use good hand hygiene technique  - Identify and instruct in appropriate isolation precautions for identified infection/condition  Outcome: Progressing     Problem: SAFETY ADULT  Goal: Patient will remain free of falls  Description: INTERVENTIONS:  - Educate patient/family on patient safety including physical limitations  - Instruct patient to call for assistance with activity   - Consult OT/PT to assist with strengthening/mobility   - Keep Call bell within reach  - Keep bed low and locked with side rails adjusted as appropriate  - Keep care items and personal belongings within reach  - Initiate and maintain comfort rounds  - Make Fall Risk Sign visible to staff  - Offer Toileting every 2 Hours, in advance of need if unable to notify staff  - Initiate/Maintain bed/chair alarm  - Apply yellow socks and bracelet for high  fall risk patients  - Consider moving patient to room near nurses station  Outcome: Progressing  Goal: Maintain or return to baseline ADL function  Description: INTERVENTIONS:  -  Assess patient's ability to carry out ADLs; assess patient's baseline for ADL function and identify physical deficits which impact ability to perform ADLs (bathing, care of mouth/teeth, toileting, grooming, dressing, etc.)  - Assess/evaluate cause of self-care deficits   - Assess range of motion  - Assess patient's mobility; develop plan if impaired  - Assess patient's need for assistive devices and provide as appropriate  - Encourage maximum independence but intervene and supervise when necessary  - Involve family in performance of ADLs  - Assess for home care needs following discharge   - Consider OT consult to assist with ADL evaluation and planning for discharge  - Provide patient education as appropriate  Outcome: Progressing  Goal: Maintains/Returns to pre admission functional level  Description: INTERVENTIONS:  - Perform AM-PAC 6 Click Basic Mobility/ Daily Activity assessment daily.  - Set and communicate daily mobility goal to care team and patient/family/caregiver.   - Collaborate with rehabilitation services on mobility goals if consulted  - Perform Range of Motion 3 times a day.  - Reposition patient every 2 hours if unable to reposition self  - Out of bed for toileting if medically appropriate  - Record patient progress and toleration of activity level   Outcome: Progressing     Problem: DISCHARGE PLANNING  Goal: Discharge to home or other facility with appropriate resources  Description: INTERVENTIONS:  - Identify barriers to discharge w/patient and caregiver  - Arrange for needed discharge resources and transportation as appropriate  - Identify discharge learning needs (meds, wound care, etc.)  - Arrange for interpretive services to assist at discharge as needed  - Refer to Case Management Department for coordinating  discharge planning if the patient needs post-hospital services based on physician/advanced practitioner order or complex needs related to functional status, cognitive ability, or social support system  Outcome: Progressing     Problem: Knowledge Deficit  Goal: Patient/family/caregiver demonstrates understanding of disease process, treatment plan, medications, and discharge instructions  Description: Complete learning assessment and assess knowledge base.  Interventions:  - Provide teaching at level of understanding  - Provide teaching via preferred learning methods  Outcome: Progressing     Problem: NEUROSENSORY - ADULT  Goal: Achieves stable or improved neurological status  Description: INTERVENTIONS  - Monitor and report changes in neurological status  - Monitor vital signs such as temperature, blood pressure, glucose, and any other labs ordered   - Initiate measures to prevent increased intracranial pressure  - Monitor for seizure activity and implement precautions if appropriate      Outcome: Progressing  Goal: Remains free of injury related to seizures activity  Description: INTERVENTIONS  - Maintain airway, patient safety  and administer oxygen as ordered  - Monitor patient for seizure activity, document and report duration and description of seizure to physician/advanced practitioner  - If seizure occurs,  ensure patient safety during seizure  - Reorient patient post seizure  - Seizure pads on all 4 side rails  - Instruct patient/family to notify RN of any seizure activity including if an aura is experienced  - Instruct patient/family to call for assistance with activity based on nursing assessment  - Administer anti-seizure medications if ordered    Outcome: Progressing  Goal: Achieves maximal functionality and self care  Description: INTERVENTIONS  - Monitor swallowing and airway patency with patient fatigue and changes in neurological status  - Encourage and assist patient to increase activity and self  care.   - Encourage visually impaired, hearing impaired and aphasic patients to use assistive/communication devices  Outcome: Progressing     Problem: METABOLIC, FLUID AND ELECTROLYTES - ADULT  Goal: Electrolytes maintained within normal limits  Description: INTERVENTIONS:  - Monitor labs and assess patient for signs and symptoms of electrolyte imbalances  - Administer electrolyte replacement as ordered  - Monitor response to electrolyte replacements, including repeat lab results as appropriate  - Instruct patient on fluid and nutrition as appropriate  Outcome: Progressing  Goal: Fluid balance maintained  Description: INTERVENTIONS:  - Monitor labs   - Monitor I/O and WT  - Instruct patient on fluid and nutrition as appropriate  - Assess for signs & symptoms of volume excess or deficit  Outcome: Progressing  Goal: Glucose maintained within target range  Description: INTERVENTIONS:  - Monitor Blood Glucose as ordered  - Assess for signs and symptoms of hyperglycemia and hypoglycemia  - Administer ordered medications to maintain glucose within target range  - Assess nutritional intake and initiate nutrition service referral as needed  Outcome: Progressing     Problem: Neurological Deficit  Goal: Neurological status is stable or improving  Description: Interventions:  - Monitor and assess patient's level of consciousness, motor function, sensory function, and level of assistance needed for ADLs.   - Monitor and report changes from baseline. Collaborate with interdisciplinary team to initiate plan and implement interventions as ordered.   - Provide and maintain a safe environment.  - Consider seizure precautions.  - Consider fall precautions.  - Consider aspiration precautions.  - Consider bleeding precautions.  Outcome: Progressing     Problem: Activity Intolerance/Impaired Mobility  Goal: Mobility/activity is maintained at optimum level for patient  Description: Interventions:  - Assess and monitor patient  barriers  to mobility and need for assistive/adaptive devices.  - Assess patient's emotional response to limitations.  - Collaborate with interdisciplinary team and initiate plans and interventions as ordered.  - Encourage independent activity per ability.  - Maintain proper body alignment.  - Perform active/passive rom as tolerated/ordered.  - Plan activities to conserve energy.  - Turn patient as appropriate  Outcome: Progressing     Problem: Communication Impairment  Goal: Ability to express needs and understand communication  Description: Assess patient's communication skills and ability to understand information.  Patient will demonstrate use of effective communication techniques, alternative methods of communication and understanding even if not able to speak.     - Encourage communication and provide alternate methods of communication as needed.  - Collaborate with case management/ for discharge needs.  - Include patient/family/caregiver in decisions related to communication.  Outcome: Progressing     Problem: Potential for Aspiration  Goal: Non-ventilated patient's risk of aspiration is minimized  Description: Assess and monitor vital signs, respiratory status, and labs (WBC).  Monitor for signs of aspiration (tachypnea, cough, rales, wheezing, cyanosis, fever).    - Assess and monitor patient's ability to swallow.  - Place patient up in chair to eat if possible.  - HOB up at 90 degrees to eat if unable to get patient up into chair.  - Supervise patient during oral intake.   - Instruct patient/ family to take small bites.  - Instruct patient/ family to take small single sips when taking liquids.  - Follow patient-specific strategies generated by speech pathologist.  Outcome: Progressing  Goal: Ventilated patient's risk of aspiration is minimized  Description: Assess and monitor vital signs, respiratory status, airway cuff pressure, and labs (WBC).  Monitor for signs of aspiration (tachypnea, cough,  rales, wheezing, cyanosis, fever).    - Elevate head of bed 30 degrees if patient has tube feeding.  - Monitor tube feeding.  Outcome: Progressing     Problem: Nutrition  Goal: Nutrition/Hydration status is improving  Description: Monitor and assess patient's nutrition/hydration status for malnutrition (ex- brittle hair, bruises, dry skin, pale skin and conjunctiva, muscle wasting, smooth red tongue, and disorientation). Collaborate with interdisciplinary team and initiate plan and interventions as ordered.  Monitor patient's weight and dietary intake as ordered or per policy. Utilize nutrition screening tool and intervene per policy. Determine patient's food preferences and provide high-protein, high-caloric foods as appropriate.     - Assist patient with eating.  - Allow adequate time for meals.  - Encourage patient to take dietary supplement as ordered.  - Collaborate with clinical nutritionist.  - Include patient/family/caregiver in decisions related to nutrition.  Outcome: Progressing

## 2024-02-07 NOTE — ASSESSMENT & PLAN NOTE
Suspected due to hyperglycemia in setting of sugars >500. Stroke workup was initiated. Infectious workup being done  UA without evidence of infection  Mildly elevated lactic acid  Blood cultures ordered  Ethanol negative  UDS negative  No leukocytosis  Procal normal  Control sugars and monitor symptoms

## 2024-02-07 NOTE — RESPIRATORY THERAPY NOTE
RT Protocol Note  Alli Burt . 58 y.o. male MRN: [id number]  Unit/Bed#: -01 Encounter: [id number]    Assessment    Principal Problem:    Stroke-like symptoms  Active Problems:    Type 2 diabetes mellitus with hyperosmolar hyperglycemic state (HHS) (HCC)    Seizure (HCC)    Hypertension    Acute metabolic encephalopathy    Pseudohyponatremia    JOHN (acute kidney injury) (HCC)    Alcoholic cirrhosis (HCC)    Other specified hypothyroidism    Chronic obstructive pulmonary disease with acute exacerbation (HCC)      Home Pulmonary Medications:  none       Past Medical History:   Diagnosis Date    CKD (chronic kidney disease)     Diabetes mellitus (HCC)     Disease of thyroid gland     High cholesterol     Hypertension 2/6/2024    Seizure (HCC) 2/6/2024     Social History     Socioeconomic History    Marital status:      Spouse name: None    Number of children: None    Years of education: None    Highest education level: None   Occupational History    None   Tobacco Use    Smoking status: Some Days     Types: Cigars    Smokeless tobacco: Never   Vaping Use    Vaping status: Every Day    Substances: Nicotine   Substance and Sexual Activity    Alcohol use: Not Currently    Drug use: Not Currently     Types: Cocaine    Sexual activity: None   Other Topics Concern    None   Social History Narrative    None     Social Determinants of Health     Financial Resource Strain: Not on file   Food Insecurity: Not on file   Transportation Needs: Not on file   Physical Activity: Not on file   Stress: Not on file   Social Connections: Not on file   Intimate Partner Violence: Not on file   Housing Stability: Not on file       Subjective         Objective    Physical Exam:   Assessment Type: Pre-treatment  General Appearance: Drowsy  Respiratory Pattern: Normal  Chest Assessment: Chest expansion symmetrical  Bilateral Breath Sounds: Clear, Diminished  Cough: Productive, Congested    Vitals:  Blood pressure 101/63,  "pulse 71, temperature 98.5 °F (36.9 °C), temperature source Oral, resp. rate 16, height 5' 8\" (1.727 m), weight 110 kg (243 lb 3.2 oz), SpO2 99%.          Imaging and other studies:           Plan    Respiratory Plan: No distress/Pulmonary history        Resp Comments: pt resting on RA with sats in 90's. Pt does not wear O@ at home, pt takes no breathing txs at home, does have a hx of COPD. Pt not in resp distress at this time. Change txs to PRN per protocol   "

## 2024-02-07 NOTE — PLAN OF CARE
Problem: PAIN - ADULT  Goal: Verbalizes/displays adequate comfort level or baseline comfort level  Description: Interventions:  - Encourage patient to monitor pain and request assistance  - Assess pain using appropriate pain scale  - Administer analgesics based on type and severity of pain and evaluate response  - Implement non-pharmacological measures as appropriate and evaluate response  - Consider cultural and social influences on pain and pain management  - Notify physician/advanced practitioner if interventions unsuccessful or patient reports new pain  Outcome: Progressing     Problem: INFECTION - ADULT  Goal: Absence or prevention of progression during hospitalization  Description: INTERVENTIONS:  - Assess and monitor for signs and symptoms of infection  - Monitor lab/diagnostic results  - Monitor all insertion sites, i.e. indwelling lines, tubes, and drains  - Monitor endotracheal if appropriate and nasal secretions for changes in amount and color  - [place] appropriate cooling/warming therapies per order  - Administer medications as ordered  - Instruct and encourage patient and family to use good hand hygiene technique  - Identify and instruct in appropriate isolation precautions for identified infection/condition  Outcome: Progressing     Problem: SAFETY ADULT  Goal: Patient will remain free of falls  Description: INTERVENTIONS:  - Educate patient/family on patient safety including physical limitations  - Instruct patient to call for assistance with activity   - Consult OT/PT to assist with strengthening/mobility   - Keep Call bell within reach  - Keep bed low and locked with side rails adjusted as appropriate  - Keep care items and personal belongings within reach  - Initiate and maintain comfort rounds  - Make Fall Risk Sign visible to staff  - Offer Toileting every 2 Hours, in advance of need if unable to notify staff  - Initiate/Maintain bed/chair alarm  - Apply yellow socks and bracelet for high  fall risk patients  - Consider moving patient to room near nurses station  Outcome: Progressing  Goal: Maintain or return to baseline ADL function  Description: INTERVENTIONS:  -  Assess patient's ability to carry out ADLs; assess patient's baseline for ADL function and identify physical deficits which impact ability to perform ADLs (bathing, care of mouth/teeth, toileting, grooming, dressing, etc.)  - Assess/evaluate cause of self-care deficits   - Assess range of motion  - Assess patient's mobility; develop plan if impaired  - Assess patient's need for assistive devices and provide as appropriate  - Encourage maximum independence but intervene and supervise when necessary  - Involve family in performance of ADLs  - Assess for home care needs following discharge   - Consider OT consult to assist with ADL evaluation and planning for discharge  - Provide patient education as appropriate  Outcome: Progressing  Goal: Maintains/Returns to pre admission functional level  Description: INTERVENTIONS:  - Perform AM-PAC 6 Click Basic Mobility/ Daily Activity assessment daily.  - Set and communicate daily mobility goal to care team and patient/family/caregiver.   - Collaborate with rehabilitation services on mobility goals if consulted  - Perform Range of Motion 3 times a day.  - Reposition patient every 2 hours if unable to reposition self  - Out of bed for toileting if medically appropriate  - Record patient progress and toleration of activity level   Outcome: Progressing     Problem: DISCHARGE PLANNING  Goal: Discharge to home or other facility with appropriate resources  Description: INTERVENTIONS:  - Identify barriers to discharge w/patient and caregiver  - Arrange for needed discharge resources and transportation as appropriate  - Identify discharge learning needs (meds, wound care, etc.)  - Arrange for interpretive services to assist at discharge as needed  - Refer to Case Management Department for coordinating  discharge planning if the patient needs post-hospital services based on physician/advanced practitioner order or complex needs related to functional status, cognitive ability, or social support system  Outcome: Progressing     Problem: Knowledge Deficit  Goal: Patient/family/caregiver demonstrates understanding of disease process, treatment plan, medications, and discharge instructions  Description: Complete learning assessment and assess knowledge base.  Interventions:  - Provide teaching at level of understanding  - Provide teaching via preferred learning methods  Outcome: Progressing     Problem: NEUROSENSORY - ADULT  Goal: Achieves stable or improved neurological status  Description: INTERVENTIONS  - Monitor and report changes in neurological status  - Monitor vital signs such as temperature, blood pressure, glucose, and any other labs ordered   - Initiate measures to prevent increased intracranial pressure  - Monitor for seizure activity and implement precautions if appropriate      Outcome: Progressing  Goal: Remains free of injury related to seizures activity  Description: INTERVENTIONS  - Maintain airway, patient safety  and administer oxygen as ordered  - Monitor patient for seizure activity, document and report duration and description of seizure to physician/advanced practitioner  - If seizure occurs,  ensure patient safety during seizure  - Reorient patient post seizure  - Seizure pads on all 4 side rails  - Instruct patient/family to notify RN of any seizure activity including if an aura is experienced  - Instruct patient/family to call for assistance with activity based on nursing assessment  - Administer anti-seizure medications if ordered    Outcome: Progressing  Goal: Achieves maximal functionality and self care  Description: INTERVENTIONS  - Monitor swallowing and airway patency with patient fatigue and changes in neurological status  - Encourage and assist patient to increase activity and self  care.   - Encourage visually impaired, hearing impaired and aphasic patients to use assistive/communication devices  Outcome: Progressing     Problem: METABOLIC, FLUID AND ELECTROLYTES - ADULT  Goal: Electrolytes maintained within normal limits  Description: INTERVENTIONS:  - Monitor labs and assess patient for signs and symptoms of electrolyte imbalances  - Administer electrolyte replacement as ordered  - Monitor response to electrolyte replacements, including repeat lab results as appropriate  - Instruct patient on fluid and nutrition as appropriate  Outcome: Progressing  Goal: Fluid balance maintained  Description: INTERVENTIONS:  - Monitor labs   - Monitor I/O and WT  - Instruct patient on fluid and nutrition as appropriate  - Assess for signs & symptoms of volume excess or deficit  Outcome: Progressing  Goal: Glucose maintained within target range  Description: INTERVENTIONS:  - Monitor Blood Glucose as ordered  - Assess for signs and symptoms of hyperglycemia and hypoglycemia  - Administer ordered medications to maintain glucose within target range  - Assess nutritional intake and initiate nutrition service referral as needed  Outcome: Progressing

## 2024-02-07 NOTE — OCCUPATIONAL THERAPY NOTE
Occupational Therapy Evaluation     Patient Name: Alli Burt Sr.  Today's Date: 2/7/2024  Problem List  Principal Problem:    Stroke-like symptoms  Active Problems:    Type 2 diabetes mellitus with hyperosmolar hyperglycemic state (HHS) (HCC)    Seizure (HCC)    Hypertension    Acute metabolic encephalopathy    Pseudohyponatremia    JOHN (acute kidney injury) (HCC)    Alcoholic cirrhosis (HCC)    Other specified hypothyroidism    Chronic obstructive pulmonary disease with acute exacerbation (HCC)    Past Medical History  Past Medical History:   Diagnosis Date    CKD (chronic kidney disease)     Diabetes mellitus (HCC)     Disease of thyroid gland     High cholesterol     Hypertension 2/6/2024    Seizure (HCC) 2/6/2024     Past Surgical History  History reviewed. No pertinent surgical history.        02/07/24 1138   Note Type   Note type Evaluation   Pain Assessment   Pain Assessment Tool FLACC   Pain Location/Orientation Orientation: Bilateral;Location: Leg   Pain Rating: FLACC (Rest) - Face 1   Pain Rating: FLACC (Rest) - Legs 0   Pain Rating: FLACC (Rest) - Activity 0   Pain Rating: FLACC (Rest) - Cry 0   Pain Rating: FLACC (Rest) - Consolability 0   Score: FLACC (Rest) 1   Pain Rating: FLACC (Activity) - Face 1   Pain Rating: FLACC (Activity) - Legs 0   Pain Rating: FLACC (Activity) - Activity 0   Pain Rating: FLACC (Activity) - Cry 1   Pain Rating: FLACC (Activity) - Consolability 0   Score: FLACC (Activity) 2   Restrictions/Precautions   Other Precautions Chair Alarm;Bed Alarm;Fall Risk;Telemetry;Multiple lines;Pain   Home Living   Type of Home Other (Comment)  (Camper)   Home Layout One level;Performs ADLs on one level;Able to live on main level with bedroom/bathroom   Bathroom Shower/Tub Walk-in shower   Bathroom Toilet Standard   Home Equipment Cane  (doesn't use)   Additional Comments Pt has been staying in his camper which has 2 GUERO L HR and which is where he plans to return to upon d/c. Pt reports  not using AD for functional mobility PTA.   Prior Function   Level of [place] Independent with ADLs;Independent with functional mobility;Independent with IADLS   Lives With Alone   Receives Help From Friend(s)   IADLs Independent with driving;Independent with meal prep;Independent with medication management   Falls in the last 6 months 0   Comments PTA, pt reports independence with ADLs, IADLs, and functional mobility without AD.   ADL   UB Dressing Assistance 6  Modified independent   LB Dressing Assistance 5  Supervision/Setup   LB Dressing Deficit Don/doff R sock;Don/doff L sock   Additional Comments Pt able to doff/don R sock while seated EOB with increased time. Overall, LB ADLs with supervision. UB ADLs at mod I.   Bed Mobility   Supine to Sit 4  Minimal assistance   Additional items Assist x 1;Increased time required;Verbal cues;LE management;Other;Comment  (Trunk management)   Additional Comments Pt received supine in bed upon start of session. Pt supine > sit EOB with min assist x 1 for LE management and trunk management.   Transfers   Sit to Stand 5  Supervision   Stand to Sit 5  Supervision   Stand pivot 5  Supervision   Additional Comments All functional transfers without AD. VCs for sequencing and safety.   Functional Mobility   Functional Mobility 5  Supervision   Additional Comments Pt participated in long functional household distance in room and in hallway with supervision and no AD. At end of session, pt seated in chair with chair alarm on, call bell in reach, and all needs met.   Balance   Static Sitting Good   Dynamic Sitting Fair +   Static Standing Fair   Dynamic Standing Fair   Activity Tolerance   Activity Tolerance Patient tolerated treatment well   Medical Staff Made Aware Joycelyn ZUNIGA   Nurse Made Aware RNNenita   RUDEDE Assessment   RUE Assessment WFL   LUE Assessment   LUE Assessment WFL   Hand Function   Gross Motor Coordination Functional   Fine Motor Coordination Functional   Hand  Function Comments Pt is R hand dominant.   Sensation   Light Touch No apparent deficits   Proprioception   Proprioception No apparent deficits  (Finger to nose test- no apparent deficits)   Vision-Basic Assessment   Current Vision Wears glasses only for reading   Vision - Complex Assessment   Ocular Range of Motion Intact   Tracking Intact   Perception   Inattention/Neglect Appears intact   Cognition   Overall Cognitive Status WFL   Arousal/Participation Alert;Cooperative   Attention Within functional limits   Orientation Level Oriented X4   Memory Within functional limits   Following Commands Follows one step commands without difficulty   Comments Increased time to process/carry out commands at times. Limited insight into deficits.   Assessment   Limitation Decreased ADL status;Decreased Safe judgement during ADL;Decreased endurance;Decreased self-care trans;Decreased high-level ADLs   Prognosis Good   Assessment Pt is a 58 y.o. male, admitted to [facility] [place] 2/6/2024 d/t experiencing slurred speech. Dx: Stroke-like symptoms. Pt with PMHx impacting their performance during ADL tasks, including: DM II, acute metabolic encephalopathy, JOHN, COPD with acute exacerbation, pseudohyponatremia, other specified hypothyroidism, alcoholic cirrhosis, HTN, seizure, VALERIA. Prior to admission to the hospital Pt was performing ADLs without physical assistance. IADLs without physical assistance. Functional transfers/ambulation without physical assistance. Cognitive status PTA was WNL. OT order placed to assess Pt's ADLs, cognitive status, and performance during functional tasks in order to maximize safety and independence while making most appropriate d/c recommendations. PT/OT co-evaluation completed at this time d/t significant mobility deficits and safety concerns. Pt's clinical presentation is currently unpredictable given new onset deficits that affect Pt's occupational performance and ability to safely return to OF including decrease  activity tolerance, decrease standing balance, decrease performance during ADL tasks, decrease safety awareness , increased pain, decrease activity engagement, decrease performance during functional transfers, steps to enter home, limited family support, high fall risk, and limited insight to deficits combined with medical complications of abnormal CBC, abnormal blood sugars, abnormal sodium values, and need for input for mobility technique/safety. Personal factors affecting Pt at time of initial evaluation include: step(s) to enter environment, limited home support, inability to perform IADLs, inability to navigate community distances, limited insight into impairments, decreased initiation and engagement, questionable non-compliance, tobacco use, ETOH use, and E-cigarette use . Pt will benefit from continued skilled acute OT services to address deficits as defined above and to maximize level independence/participation during ADLs and functional tasks to facilitate return toward PLOF and improved quality of life. Pt anticipated to progress well towards goals of care. From an occupational therapy standpoint, No Post-Acute Rehabilitation Needs are recommended upon d/c.   Goals   Patient Goals to go home   Plan   Treatment Interventions ADL retraining;Functional transfer training;UE strengthening/ROM;Patient/family training;Equipment evaluation/education;Compensatory technique education;Endurance training;Continued evaluation;Cardiac education;Energy conservation;Activityengagement   Goal Expiration Date 02/21/24   OT Frequency 1-2x/wk   Discharge Recommendation   Rehab Resource Intensity Level, OT No post-acute rehabilitation needs   AM-PAC Daily Activity Inpatient   Lower Body Dressing 3   Bathing 3   Toileting 3   Upper Body Dressing 4   Grooming 4   Eating 4   Daily Activity Raw Score 21   Daily Activity Standardized Score (Calc for Raw Score >=11) 44.27   AM-PAC Applied Cognition Inpatient   Following a  Speech/Presentation 3   Understanding Ordinary Conversation 4   Taking Medications 3   Remembering Where Things Are Placed or Put Away 3   Remembering List of 4-5 Errands 3   Taking Care of Complicated Tasks 3   Applied Cognition Raw Score 19   Applied Cognition Standardized Score 39.77     The patient's raw score on the -PAC Daily Activity Inpatient Short Form is 21. A raw score of greater than or equal to 19 suggests the patient may benefit from discharge to home. Please refer to the recommendation of the Occupational Therapist for safe discharge planning.    Pt goals to be met by 2/21/2024:    Pt will demonstrate ability to complete supine<>sit @ mod I in order to increase safety and independence during ADL tasks.  Pt will demonstrate ability to complete LB dressing @ mod I in order to increase safety and independence during meaningful tasks.   Pt will demonstrate ability to mark/doff socks/shoes while sitting EOB/chair @ mod I in order to increase safety and independence during meaningful tasks.   Pt will demonstrate ability to complete toileting tasks including CM and pericare @ mod I in order to increase safety and independence during meaningful tasks.  Pt will demonstrate ability to complete EOB, chair, toilet/commode transfers @ mod I in order to increase performance and participation during functional tasks.  Pt will demonstrate ability to stand for 10 minutes while maintaining F+ balance with use of RW for UB support PRN.  Pt will demonstrate ability to tolerate 30 minute OT session with no vc'ing for deep breathing or use of energy conservation techniques in order to increase activity tolerance during functional tasks.   Pt will demonstrate Good carryover of use of energy conservation/compensatory strategies during ADLs and functional tasks in order to increase safety and reduce risk for falls.   Pt will demonstrate Good attention and participation in continued evaluation of functional ambulation house  hold distances in order to assist with safe d/c planning.  Pt will attend to continued cognitive assessments 100% of the time in order to provide most appropriate d/c recommendations.   Pt will follow 100% simple 2-step commands and be A&O x4 consistently with environmental cues to increase participation in functional activities.   Pt will identify 3 areas of interest/hobbies and 1 intervention on how to incorporate into daily life in order to increase interaction with environment and peers as well as increase participation in meaningful tasks.   Pt will demonstrate 100% carryover of BUE HEP in order to increase BUE MS and increase performance during functional tasks upon d/c home.    Jese Duarte MS, OTR/L

## 2024-02-07 NOTE — PLAN OF CARE
Problem: PHYSICAL THERAPY ADULT  Goal: Performs mobility at highest level of function for planned discharge setting.  See evaluation for individualized goals.  Description: Treatment/Interventions: ADL retraining, Functional transfer training, LE strengthening/ROM, Elevations, Therapeutic exercise, Endurance training, Patient/family training, Equipment eval/education, Bed mobility, Gait training, Compensatory technique education, Spoke to nursing, Spoke to case management, OT  Equipment Recommended:  (none anticipated)       See flowsheet documentation for full assessment, interventions and recommendations.  Note: Prognosis: Good  Problem List: Decreased strength, Decreased endurance, Impaired balance, Decreased mobility, Decreased coordination, Impaired judgement, Decreased safety awareness, Obesity  Assessment: Pt is a 58 y.o. male seen for PT evaluation s/p admission to [facility] on 2/6/2024 with Stroke-like symptoms.  Order placed for PT services.  Upon evaluation: Pt is presenting with impaired functional mobility due to decreased strength, decreased endurance, impaired balance, impaired coordination, gait deviations, decreased safety awareness, impaired judgment, and fall risk requiring  minimal assistance for bed mobility, supervision assistance for transfers, and close supervision assistance for ambulation with out AD . Pt's clinical presentation is currently unpredictable given the functional mobility deficits above, especially weakness, decreased endurance, impaired coordination, impaired balance, gait deviations, decreased activity tolerance, decreased functional mobility tolerance, decreased safety awareness, and impaired judgement, coupled with fall risks as indicated by AM-PAC 6-Clicks: 18/24 as well as impaired balance, polypharmacy, impaired judgement, decreased safety awareness, and obesity and combined with medical complications of abnormal blood sugars, abnormal sodium  values, need for input for mobility technique/safety, and acute metabolic encephalopathy, JOHN, COPD with acute exacerbation .  Pt's PMHx and comorbidities that may affect physical performance and progress include:  h/o seizures, HTN, alcoholic cirrhosis, COPD, DM, h/o CVA . Personal factors affecting pt at time of IE include: step(s) to enter environment, limited home support, inability to perform IADLs, inability to navigate community distances, and limited insight into impairments. Pt will benefit from continued skilled PT services to address deficits as defined above and to maximize level of functional mobility to facilitate return toward PLOF and improved QOL. From PT/mobility standpoint, recommendation at time of d/c would be Level III (Minimum Resource Intensity) in order to reduce fall risk and maximize pt's functional independence and consistency with mobility. Recommend ther ex next 1-2 sessions.  Barriers to Discharge: Decreased caregiver support  Barriers to Discharge Comments: lives alone  Rehab Resource Intensity Level, PT: III (Minimum Resource Intensity) (OPPT handout provided for [facility] Locations and services, pt aware of freedom of choice)    See flowsheet documentation for full assessment.

## 2024-02-07 NOTE — DISCHARGE INSTR - AVS FIRST PAGE
Dear Alli Burt Sr.,     It was our pleasure to care for you here at [facility]. For follow up as well as any medication refills, we recommend that you follow up with your primary care physician. Here are the most important instructions/ recommendations at discharge:     Notable Medication Adjustments -   Start using lantus 55 units nightly   Start using humalog 18 units three times daily with meals  Testing Required after Discharge -   MRI of the liver with your PCP or GI in 1-2 weeks  Repeat thyroid studies in 4-6 weeks with your endocrinologist  Important follow up information -   Follow up with PCP in 1 week  Follow up with endocrinology in 1-2 weeks outpatient  Follow up with GI outpatient in 1-2 weeks  Other Instructions -   Please continue to take your medications as prescribed. Your cholesterol, A1c, and thyroid numbers were found to be high, it is important to continue using your medications as prescribed and calling your PCP or endocrinologist for refills. If you have any new or worsening symptoms please return to the ED. Please monitor your blood sugars closely at home.   Please review this entire after visit summary as additional general instructions including medication list, appointments, activity, diet, any pertinent wound care, and other additional recommendations from your care team that may be provided for you.      Sincerely,     Ashley Stevens PA-C

## 2024-02-07 NOTE — SPEECH THERAPY NOTE
Speech Language/Pathology  Consult received.  Records reviewed.  Pt admitted c symptoms concerning for CVA/TIA.  Pt passed RN Dysphagia Assessment.  Communication deficits denied.    MRI results pending.  No additional inpatient Speech Pathology evaluation appears indicated at this time.  Please re-consult if additional concerns arise. Thank you.    Janna aSinz, MS CCC-SLP  2/7/2024

## 2024-02-07 NOTE — CASE MANAGEMENT
Case Management Assessment & Discharge Planning Note    Patient name Alli Burt Sr.  Location /-01 MRN [id number]  : 1965 Date 2024       Current Admission Date: 2024  Current Admission Diagnosis:Stroke-like symptoms   Patient Active Problem List    Diagnosis Date Noted    Stroke-like symptoms 2024    Type 2 diabetes mellitus with hyperosmolar hyperglycemic state (HHS) (HCC) 2024    Seizure (HCC) 2024    Hypertension 2024    Acute metabolic encephalopathy 2024    Pseudohyponatremia 2024    JOHN (acute kidney injury) (HCC) 2024    Alcoholic cirrhosis (HCC) 2024    Other specified hypothyroidism 2024    Chronic obstructive pulmonary disease with acute exacerbation (HCC) 2024      LOS (days): 1  Geometric Mean LOS (GMLOS) (days):   Days to GMLOS:     OBJECTIVE:    Risk of Unplanned Readmission Score: 15.81         Current admission status: Inpatient  Referral Reason: Other, Stroke (dc planning- stroke protocol)    Preferred Pharmacy:   [facility] [address] [place] [address]  [address] [address]  Phone: [phone] Fax: [phone]    Primary Care Provider: No primary care provider on file.    Primary Insurance: MEDICARE  Secondary Insurance: [facility]      CM met with patient at the bedside,baseline information  was obtained. CM discussed the role of CM in helping the patient develop a discharge plan and assist the patient in carry out their plan.      ASSESSMENT:  Active Health Care Proxies    There are no active Health Care Proxies on file.       Advance Directives  Does patient have a Health Care POA?: No  Was patient offered paperwork?: Yes (declined)  Does patient currently have a Health Care decision maker?: Yes, please see Health Care Proxy section  Does patient have Advance Directives?: No  Was patient offered paperwork?: Yes  (provided)  Primary Contact: Alli Burt Sr. (Son)   [phone] (         Readmission Root Cause  30 Day Readmission: No    Patient Information  Admitted from:: Home  Mental Status: Alert  During Assessment patient was accompanied by: Not accompanied during assessment  Assessment information provided by:: Patient  Primary Caregiver: Self  Support Systems: Son, Friend  County [place] Residence: [place] (Patient resides in [place], however spends majority of time in [place],)  What city do you live in?: Patient resides in [place], however spends majority of time in [place],  [place] entry access options. Select all that apply.: No steps to enter home  Type of Current Residence: 2 story home  Upon entering residence, is there a bedroom on the main floor (no further steps)?: No  A bedroom is located on the following floor levels of residence (select all that apply):: 2nd Floor  Upon entering residence, is there a bathroom on the main floor (no further steps)?: Yes  Number of steps to 2nd floor from main floor: One Flight  Living Arrangements: Lives w/ Son (In [place] he resides with son, [facility] alone)  Is patient a ?: No    Activities of Daily Living Prior to Admission  Functional Status: Independent  Completes ADLs independently?: Yes  Ambulates independently?: Yes  Does patient use assisted devices?: No  Does patient have a history of Outpatient Therapy (PT/OT)?: No  Does the patient have a history of Short-Term Rehab?: No  Does patient have a history of HHC?: No  Does patient currently have HHC?: No         Patient Information Continued  Income Source:  (disabled on SS and works part time)  Does patient have prescription coverage?: Yes  Does patient receive dialysis treatments?: No  Does patient have a history of substance abuse?: No, Yes  Historical substance use preference: Alcohol/ETOH  Is patient currently in treatment for substance abuse?: N/A - sober (10 years)  Does  patient have a history of Mental Health Diagnosis?: Yes  Is patient receiving treatment for mental health?: Yes (depression/anxiety on medications)  Has patient received inpatient treatment related to mental health in the last 2 years?: No         Means of Transportation  Means of Transport to Appts:: Drives Self      Housing Stability: Low Risk  (2/7/2024)    Housing Stability Vital Sign     Unable to Pay for Housing in the Last Year: No     Number of Places Lived in the Last Year: 2     Unstable Housing in the Last Year: No   Food Insecurity: No Food Insecurity (2/7/2024)    Hunger Vital Sign     Worried About Running Out of Food in the Last Year: Never true     Ran Out of Food in the Last Year: Never true   Transportation Needs: No Transportation Needs (2/7/2024)    PRAPARE - Transportation     Lack of Transportation (Medical): No     Lack of Transportation (Non-Medical): No   Utilities: Not At Risk (2/7/2024)    [facility] Utilities     Threatened with loss of utilities: No       DISCHARGE DETAILS:    Discharge planning discussed with:: patient  Freedom of Choice: Yes  Comments - Freedom of Choice: Discussed : 1) Insulin management, pt admits that he does check his blood surgars, several time a day. 2) admits he does have an issue getting his medications from the pharmacy, currently using [facility] as they deliver, however he says they are not timely to send out. - Pt wants a pharmacy that delivers his medications as he does not want to go to the pharmacy.  3) patient has an upcoming follow up for his DM at the end of Feb. - per his endocrine MD trulicity and Jaurdance were on hold at this time. 4) Discussed that he currently does not have a PCP, he would like one closer to [place]. He is aware there is one in [facility] and he is interested in this practive.  CM provided him the phone number for him to call to see if they are seeing new patients.  CM contacted family/caregiver?: No- see comments                   Requested Home Health Care         Is the patient interested in HHC at discharge?: No    DME Referral Provided  Referral made for DME?: No    Other Referral/Resources/Interventions Provided:  Interventions: PCP  Referral Comments: Patient to follow and call [facility] to see if they can accept any new patients.         Treatment Team Recommendation: Home  Discharge Destination Plan:: Home  Transport at Discharge :  (friend)      CM will continue to follow.

## 2024-02-07 NOTE — ASSESSMENT & PLAN NOTE
Lab Results   Component Value Date    HGBA1C 12.9 (H) 02/07/2024     Presents to ED with slurred speech and taking longer to answer questions per patient's friend. Patient's home diabetic regimen includes jardiance, trulicity, novolog 18 units, and lantus 50 units - appears outpatient endo office has been telling patient to come to the ED due to sugars being high and patient was out of meds for some time.   Glucose in the ED on   Anion gap: 12  Betahydroxybutyrate 0.1  Lactic acid 2.3  Urine ketones: negative  Potassium: 4.6  Sodium: 126 (corrected to 139)  Phosphate normal  WBC normal  Strict I & Os  Aggressive IV hydration  Insulin drip and IVF rehydration started, continue to monitor sugars  Endo consulted

## 2024-02-07 NOTE — NURSING NOTE
Pt received from icu, insulin drip infusing at 4u/kg/hour upon transfer. Pt a/o x 4. Call bell in reach.

## 2024-02-07 NOTE — ASSESSMENT & PLAN NOTE
Presents to ED with slurred speech and taking longer time to answer questions per patient's friend. Feels dizzy and tired since waking up.   NIH in ED 1, NIH now = 1; symptoms of dysarthria  CT scan Hypodensities within the left anterior limb of the internal capsule/basal ganglia may represent age-indeterminate infarcts. Differential includes microangiopathic changes among other etiologies. Clinical correlation advised.   CTA head/neck not done.   MRI brain: No acute infarction, edema, or mass effect. Chronic lacunar infarct involving the anterior limb of the left internal capsule chronic to the CT hypodensity. Few punctate hyperintense T2/FLAIR foci are noted within the periventricular and subcortical white matter which are nonspecific and can be seen with vasculitis, migrainous angiopathy, or precocious microangiopathic changes.   Stroke pathway  Permissive hypertension if s/s <24 hours   DVT prophylaxis   PT/OT/ST  Statin  Lipid pane with elevated cholesterol, triglycerides 939, LDL: 153, TSH: 69.699, A1c: 12.9  Telemetry, neuro checks   Neurology consult: MRI brain, If negative, then addressing other comorbid conditions as per primary team.   No stroke

## 2024-02-07 NOTE — PHYSICAL THERAPY NOTE
PHYSICAL THERAPY EVALUATION  NAME:  Alli Burt .  DATE: 02/07/24    AGE:   58 y.o.  Mrn:   [id number]  ADMIT DX:  Hyperglycemia [R73.9]  Stroke-like symptoms [R29.90]  Type 2 diabetes mellitus with hyperosmolar hyperglycemic state (HHS) (HCC) [E11.00]    Past Medical History:   Diagnosis Date    CKD (chronic kidney disease)     Diabetes mellitus (HCC)     Disease of thyroid gland     High cholesterol     Hypertension 2/6/2024    Seizure (HCC) 2/6/2024     Length Of Stay: 1  Performed at least 2 patient identifiers during session: Name and Birthday  PHYSICAL THERAPY EVALUATION :    02/07/24 1137   PT Last Visit   PT Visit Date 02/07/24   Note Type   Note type Evaluation   Pain Assessment   Pain Assessment Tool FLACC   Pain Location/Orientation Orientation: Bilateral;Location: Leg   Pain Rating: FLACC (Rest) - Face 0   Pain Rating: FLACC (Rest) - Legs 0   Pain Rating: FLACC (Rest) - Activity 0   Pain Rating: FLACC (Rest) - Cry 0   Pain Rating: FLACC (Rest) - Consolability 0   Score: FLACC (Rest) 0   Pain Rating: FLACC (Activity) - Face 1   Pain Rating: FLACC (Activity) - Legs 0   Pain Rating: FLACC (Activity) - Activity 0   Pain Rating: FLACC (Activity) - Cry 1   Pain Rating: FLACC (Activity) - Consolability 0   Score: FLACC (Activity) 2   Restrictions/Precautions   Other Precautions Chair Alarm;Fall Risk;Telemetry;Multiple lines   Home Living   Type of Home Other (Comment)  (camper 2 GUERO L HR)   Home Layout One level;Performs ADLs on one level;Able to live on main level with bedroom/bathroom   Bathroom Shower/Tub Walk-in shower   Bathroom Toilet Standard   Home Equipment Cane  (doesn't use)   Additional Comments Reports staying in his camper with 2 GUERO L HR and not using an AD PTA.   Prior Function   Level of [place] Independent with ADLs;Independent with functional mobility;Independent with IADLS   Lives With Alone   Receives Help From Friend(s)   IADLs Independent with driving;Independent with  "meal prep;Independent with medication management   Falls in the last 6 months 0   Comments Reports being independent with mobility, ADLs and IADLs.   General   Additional Pertinent History CT head showing: Hypodensities within the left anterior limb of the internal capsule/basal ganglia may represent age-indeterminate infarcts. MRI completed, but not read at time of evaluation.   Cognition   Orientation Level Oriented X4   Following Commands Follows one step commands without difficulty   Comments responds wiht increased time. repeated cues for directions   Subjective   Subjective \"I live in [place], but have been staying in my camper.\"   RLE Assessment   RLE Assessment WFL  (4+/5)   LLE Assessment   LLE Assessment WFL  (4+/5)   Coordination   Heel to Li Impaired  (slightly impaired right LE)   Rapid Alternating Movements Impaired  (slowed right LE)   Light Touch   RLE Light Touch Grossly intact   LLE Light Touch Grossly intact   Bed Mobility   Supine to Sit 4  Minimal assistance   Additional items Assist x 1;Increased time required;Verbal cues;LE management  (trunk management)   Additional Comments HOB flat without bedrail. inc time and effort ot achieve sitting at EOB requiring minAx1 to complete.   Transfers   Sit to Stand 5  Supervision   Additional items Increased time required   Stand to Sit 5  Supervision   Additional items Increased time required   Stand pivot 5  Supervision   Additional items Increased time required   Additional Comments no AD. wide YENI upon standing. sit<>stand and spt with supervision for balancewith wide YENI. spt wiht supervision with inc time.   Ambulation/Elevation   Gait pattern Wide YENI;Narrow YENI;Decreased foot clearance;Short stride;Excessively slow;Step through pattern  (slight ataxia right LE swing through, varied foot placement, inc path deviation)   Gait Assistance   (close supervision)   Additional items Verbal cues   Assistive Device None   Distance ambulated 146'x1 and " "110'x1 without AD with close supervision with slow dilcia, uncoordinated right LE swing through with varied foot placement, inc path deviation and cues for safety navigation to avoid obstacles on right and then on left as patient tended to hover close to wall. pt with LOB to right turning requiring stepping strategy to recover.   Stair Management Assistance   (close supervision)   Additional items Verbal cues   Stair Management Technique One rail L;Foreward;Reciprocal   Number of Stairs 4   Balance   Static Sitting Good   Dynamic Sitting Fair +   Static Standing Fair +   Dynamic Standing Fair   Ambulatory Fair -   Activity Tolerance   Activity Tolerance Patient tolerated treatment well   Medical Staff Made Aware OTDebra   Nurse Made Aware RN, Nenita   Assessment   Prognosis Good   Problem List Decreased strength;Decreased endurance;Impaired balance;Decreased mobility;Decreased coordination;Impaired judgement;Decreased safety awareness;Obesity   Barriers to Discharge Decreased caregiver support   Barriers to Discharge Comments lives alone   Goals   Patient Goals \"Go home today\"   STG Expiration Date 02/21/24   PT Treatment Day 0   Plan   Treatment/Interventions ADL retraining;Functional transfer training;LE strengthening/ROM;Elevations;Therapeutic exercise;Endurance training;Patient/family training;Equipment eval/education;Bed mobility;Gait training;Compensatory technique education;Spoke to nursing;Spoke to case management;OT   PT Frequency 1-2x/wk   Discharge Recommendation   Rehab Resource Intensity Level, PT III (Minimum Resource Intensity)  (OPPT handout provided for [facility] and services, pt aware of freedom of choice)   Equipment Recommended   (none anticipated)   Additional Comments support from friends prn   AM-PAC Basic Mobility Inpatient   Turning in Flat Bed Without Bedrails 3   Lying on Back to Sitting on Edge of Flat Bed Without Bedrails 3   Moving Bed to Chair 3   Standing Up From Chair " Using Arms 3   Walk in Room 3   Climb 3-5 Stairs With Railing 3   Basic Mobility Inpatient Raw Score 18   Basic Mobility Standardized Score 41.05   Highest Level Of Mobility   JH-HLM Goal 6: Walk 10 steps or more   JH-HLM Achieved 8: Walk 250 feet ot more   End of Consult   Patient Position at End of Consult Bedside chair;Bed/Chair alarm activated;All needs within reach       Pt requires PT/OT co-eval due to medical complexity, safety concerns, fall risk, significant assistance with mobility and/or cognitive-behavioral impairments.    (Please find full objective findings from PT assessment regarding body systems outlined above).     Assessment: Pt is a 58 y.o. male seen for PT evaluation s/p admission to [facility] on 2/6/2024 with Stroke-like symptoms.  Order placed for PT services.  Upon evaluation: Pt is presenting with impaired functional mobility due to decreased strength, decreased endurance, impaired balance, impaired coordination, gait deviations, decreased safety awareness, impaired judgment, and fall risk requiring  minimal assistance for bed mobility, supervision assistance for transfers, and close supervision assistance for ambulation with out AD . Pt's clinical presentation is currently unpredictable given the functional mobility deficits above, especially weakness, decreased endurance, impaired coordination, impaired balance, gait deviations, decreased activity tolerance, decreased functional mobility tolerance, decreased safety awareness, and impaired judgement, coupled with fall risks as indicated by AM-PAC 6-Clicks: 18/24 as well as impaired balance, polypharmacy, impaired judgement, decreased safety awareness, and obesity and combined with medical complications of abnormal blood sugars, abnormal sodium values, need for input for mobility technique/safety, and acute metabolic encephalopathy, JOHN, COPD with acute exacerbation .  Pt's PMHx and comorbidities that may affect physical  performance and progress include:  h/o seizures, HTN, alcoholic cirrhosis, COPD, DM, h/o CVA . Personal factors affecting pt at time of IE include: step(s) to enter environment, limited home support, inability to perform IADLs, inability to navigate community distances, and limited insight into impairments. Pt will benefit from continued skilled PT services to address deficits as defined above and to maximize level of functional mobility to facilitate return toward PLOF and improved QOL. From PT/mobility standpoint, recommendation at time of d/c would be Level III (Minimum Resource Intensity) in order to reduce fall risk and maximize pt's functional independence and consistency with mobility. Recommend ther ex next 1-2 sessions.       The patient's AM-PAC Basic Mobility Inpatient Short Form Raw Score is 18. A Raw score of greater than 16 suggests the patient may benefit from discharge to home. Please also refer to the recommendation of the Physical Therapist for safe discharge planning.       Goals: Pt will: Perform bed mobility tasks with independent to reposition in bed and prepare for transfers. Pt will perform transfers with modified Independent to decrease burden of care, decrease risk for falls, and improve activity tolerance and prepare for ambulation. Pt will ambulate with LRAD for >/= 500' with  modified Independent  to decrease burden of care, decrease risk for falls, improve activity tolerance, and improve gait quality and to access home environment. Pt will complete 1 step with LRAD and >/= 12 steps with unilateral handrail with modified Independent to decrease burden of care, decrease risk for falls, and improve activity tolerance. Pt will participate in objective balance assessment to determine baseline fall risk. Pt will participate in SSWS assessment to determine level of mobility. Pt will increase B LE strength >/= 1/2 MMT grade to facilitate functional mobility.      Joycelyn Pablo, PT,DPT

## 2024-02-07 NOTE — INCIDENTAL FINDINGS
The following findings require follow up:  Radiographic finding   Finding: Hepatomegaly and diffuse hepatic steatosis. Liver is diffusely heterogeneous. Evaluation is limited due to the lack of IV contrast and tumor is not excluded.    Follow up required: Given the history of cirrhosis, recommend MRI using liver mass protocol.    Follow up should be done within 1-2 week(s)    Please notify the following clinician to assist with the follow up:   Your PCP or GI doctor

## 2024-02-08 VITALS
HEART RATE: 82 BPM | TEMPERATURE: 97.9 F | BODY MASS INDEX: 36.86 KG/M2 | WEIGHT: 243.2 LBS | RESPIRATION RATE: 18 BRPM | DIASTOLIC BLOOD PRESSURE: 79 MMHG | OXYGEN SATURATION: 94 % | HEIGHT: 68 IN | SYSTOLIC BLOOD PRESSURE: 140 MMHG

## 2024-02-08 PROBLEM — J44.1 CHRONIC OBSTRUCTIVE PULMONARY DISEASE WITH ACUTE EXACERBATION (HCC): Status: RESOLVED | Noted: 2024-02-06 | Resolved: 2024-02-08

## 2024-02-08 PROBLEM — N17.9 AKI (ACUTE KIDNEY INJURY) (HCC): Status: RESOLVED | Noted: 2024-02-06 | Resolved: 2024-02-08

## 2024-02-08 PROBLEM — G93.41 ACUTE METABOLIC ENCEPHALOPATHY: Status: RESOLVED | Noted: 2024-02-06 | Resolved: 2024-02-08

## 2024-02-08 LAB
ANION GAP SERPL CALCULATED.3IONS-SCNC: 6 MMOL/L
BUN SERPL-MCNC: 22 MG/DL (ref 5–25)
CALCIUM SERPL-MCNC: 8.8 MG/DL (ref 8.4–10.2)
CHLORIDE SERPL-SCNC: 100 MMOL/L (ref 96–108)
CO2 SERPL-SCNC: 25 MMOL/L (ref 21–32)
CREAT SERPL-MCNC: 1.16 MG/DL (ref 0.6–1.3)
ERYTHROCYTE [DISTWIDTH] IN BLOOD BY AUTOMATED COUNT: 14 % (ref 11.6–15.1)
GFR SERPL CREATININE-BSD FRML MDRD: 69 ML/MIN/1.73SQ M
GLUCOSE SERPL-MCNC: 149 MG/DL (ref 65–140)
GLUCOSE SERPL-MCNC: 206 MG/DL (ref 65–140)
GLUCOSE SERPL-MCNC: 209 MG/DL (ref 65–140)
GLUCOSE SERPL-MCNC: 218 MG/DL (ref 65–140)
HCT VFR BLD AUTO: 37.8 % (ref 36.5–49.3)
HGB BLD-MCNC: 12.7 G/DL (ref 12–17)
MAGNESIUM SERPL-MCNC: 1.9 MG/DL (ref 1.9–2.7)
MCH RBC QN AUTO: 31.6 PG (ref 26.8–34.3)
MCHC RBC AUTO-ENTMCNC: 33.6 G/DL (ref 31.4–37.4)
MCV RBC AUTO: 94 FL (ref 82–98)
PHOSPHATE SERPL-MCNC: 2.9 MG/DL (ref 2.7–4.5)
PLATELET # BLD AUTO: 118 THOUSANDS/UL (ref 149–390)
PMV BLD AUTO: 12.7 FL (ref 8.9–12.7)
POTASSIUM SERPL-SCNC: 4 MMOL/L (ref 3.5–5.3)
RBC # BLD AUTO: 4.02 MILLION/UL (ref 3.88–5.62)
SODIUM SERPL-SCNC: 131 MMOL/L (ref 135–147)
WBC # BLD AUTO: 6.8 THOUSAND/UL (ref 4.31–10.16)

## 2024-02-08 PROCEDURE — 80048 BASIC METABOLIC PNL TOTAL CA: CPT

## 2024-02-08 PROCEDURE — 83735 ASSAY OF MAGNESIUM: CPT

## 2024-02-08 PROCEDURE — 82948 REAGENT STRIP/BLOOD GLUCOSE: CPT

## 2024-02-08 PROCEDURE — 84100 ASSAY OF PHOSPHORUS: CPT

## 2024-02-08 PROCEDURE — 85027 COMPLETE CBC AUTOMATED: CPT

## 2024-02-08 PROCEDURE — 99239 HOSP IP/OBS DSCHRG MGMT >30: CPT

## 2024-02-08 RX ORDER — INSULIN LISPRO 100 [IU]/ML
18 INJECTION, SOLUTION INTRAVENOUS; SUBCUTANEOUS
Qty: 20 ML | Refills: 2 | Status: SHIPPED | OUTPATIENT
Start: 2024-02-08

## 2024-02-08 RX ORDER — INSULIN GLARGINE 100 [IU]/ML
55 INJECTION, SOLUTION SUBCUTANEOUS
Qty: 20 ML | Refills: 2 | Status: SHIPPED | OUTPATIENT
Start: 2024-02-08

## 2024-02-08 RX ORDER — ASPIRIN 81 MG/1
81 TABLET, CHEWABLE ORAL DAILY
Qty: 30 TABLET | Refills: 2 | Status: SHIPPED | OUTPATIENT
Start: 2024-02-09

## 2024-02-08 RX ORDER — AZITHROMYCIN 250 MG/1
500 TABLET, FILM COATED ORAL EVERY 24 HOURS
Qty: 2 TABLET | Refills: 0 | Status: COMPLETED | OUTPATIENT
Start: 2024-02-08 | End: 2024-02-08

## 2024-02-08 RX ORDER — ATORVASTATIN CALCIUM 80 MG/1
80 TABLET, FILM COATED ORAL DAILY
Qty: 30 TABLET | Refills: 2 | Status: SHIPPED | OUTPATIENT
Start: 2024-02-08

## 2024-02-08 RX ORDER — INSULIN LISPRO 100 [IU]/ML
4-20 INJECTION, SOLUTION INTRAVENOUS; SUBCUTANEOUS
Qty: 20 ML | Refills: 2 | Status: CANCELLED | OUTPATIENT
Start: 2024-02-08

## 2024-02-08 RX ORDER — LEVOTHYROXINE SODIUM 0.12 MG/1
250 TABLET ORAL
Qty: 60 TABLET | Refills: 2 | Status: SHIPPED | OUTPATIENT
Start: 2024-02-09

## 2024-02-08 RX ADMIN — NICOTINE 1 PATCH: 14 PATCH, EXTENDED RELEASE TRANSDERMAL at 08:42

## 2024-02-08 RX ADMIN — LAMOTRIGINE 200 MG: 100 TABLET ORAL at 08:42

## 2024-02-08 RX ADMIN — AZITHROMYCIN 500 MG: 250 TABLET, FILM COATED ORAL at 09:36

## 2024-02-08 RX ADMIN — LEVOTHYROXINE SODIUM 250 MCG: 125 TABLET ORAL at 05:41

## 2024-02-08 RX ADMIN — INSULIN LISPRO 18 UNITS: 100 INJECTION, SOLUTION INTRAVENOUS; SUBCUTANEOUS at 12:14

## 2024-02-08 RX ADMIN — INSULIN LISPRO 4 UNITS: 100 INJECTION, SOLUTION INTRAVENOUS; SUBCUTANEOUS at 12:14

## 2024-02-08 RX ADMIN — FOLIC ACID 1 MG: 1 TABLET ORAL at 08:42

## 2024-02-08 RX ADMIN — PANTOPRAZOLE SODIUM 40 MG: 40 TABLET, DELAYED RELEASE ORAL at 05:41

## 2024-02-08 RX ADMIN — INSULIN LISPRO 18 UNITS: 100 INJECTION, SOLUTION INTRAVENOUS; SUBCUTANEOUS at 09:33

## 2024-02-08 RX ADMIN — SERTRALINE HYDROCHLORIDE 150 MG: 50 TABLET ORAL at 08:42

## 2024-02-08 RX ADMIN — INSULIN LISPRO 4 UNITS: 100 INJECTION, SOLUTION INTRAVENOUS; SUBCUTANEOUS at 09:32

## 2024-02-08 RX ADMIN — LEVETIRACETAM 1500 MG: 500 TABLET, FILM COATED ORAL at 08:42

## 2024-02-08 RX ADMIN — HEPARIN SODIUM 5000 UNITS: 5000 INJECTION INTRAVENOUS; SUBCUTANEOUS at 05:41

## 2024-02-08 NOTE — NURSING NOTE
Patient verbalized understanding of discharge instructions. PIVs removed prior to discharge. Patient has all personal belongings. Patient escorted out of building by PCA.

## 2024-02-08 NOTE — ASSESSMENT & PLAN NOTE
Patient currently taking synthroid 250 mcg outpatient, outpatient endo recommending coming to ED for evaluation due to elevated sugars and severe hypothryoidism  TSH elevated, T4 normal  Endo consult: May continue present Rx LT4 and should have thyroid labs checked in 4-6 weeks for close monitoring.

## 2024-02-08 NOTE — PLAN OF CARE
Problem: METABOLIC, FLUID AND ELECTROLYTES - ADULT  Goal: Electrolytes maintained within normal limits  Description: INTERVENTIONS:  - Monitor labs and assess patient for signs and symptoms of electrolyte imbalances  - Administer electrolyte replacement as ordered  - Monitor response to electrolyte replacements, including repeat lab results as appropriate  - Instruct patient on fluid and nutrition as appropriate  Outcome: Progressing  Goal: Glucose maintained within target range  Description: INTERVENTIONS:  - Monitor Blood Glucose as ordered  - Assess for signs and symptoms of hyperglycemia and hypoglycemia  - Administer ordered medications to maintain glucose within target range  - Assess nutritional intake and initiate nutrition service referral as needed  Outcome: Progressing     Problem: Nutrition  Goal: Nutrition/Hydration status is improving  Description: Monitor and assess patient's nutrition/hydration status for malnutrition (ex- brittle hair, bruises, dry skin, pale skin and conjunctiva, muscle wasting, smooth red tongue, and disorientation). Collaborate with interdisciplinary team and initiate plan and interventions as ordered.  Monitor patient's weight and dietary intake as ordered or per policy. Utilize nutrition screening tool and intervene per policy. Determine patient's food preferences and provide high-protein, high-caloric foods as appropriate.     - Assist patient with eating.  - Allow adequate time for meals.  - Encourage patient to take dietary supplement as ordered.  - Collaborate with clinical nutritionist.  - Include patient/family/caregiver in decisions related to nutrition.  Outcome: Progressing

## 2024-02-08 NOTE — ASSESSMENT & PLAN NOTE
Suspected due to hyperglycemia in setting of sugars >500. Stroke workup was initiated. Infectious workup being done  UA without evidence of infection  Mildly elevated lactic acid  Blood cultures no growth at 24 hours  Ethanol negative  UDS negative  No leukocytosis  Procal normal  Control sugars and monitor symptoms  Alert and oriented.

## 2024-02-08 NOTE — ASSESSMENT & PLAN NOTE
Lab Results   Component Value Date    HGBA1C 12.9 (H) 02/07/2024     Presents to ED with slurred speech and taking longer to answer questions per patient's friend. Patient's home diabetic regimen includes jardiance, trulicity, novolog 18 units, and lantus 50 units - appears outpatient endo office has been telling patient to come to the ED due to sugars being high and patient was out of meds for some time.   Glucose in the ED on   Anion gap: 12  Betahydroxybutyrate 0.1  Lactic acid 2.3  Urine ketones: negative  Potassium: 4.6  Sodium: 126 (corrected to 139)  Phosphate normal  WBC normal  Strict I & Os  Aggressive IV hydration  Insulin drip and IVF rehydration started, continue to monitor sugars  Endo consult: Recommend lantus 55 units qHS, humalog 18 units qAC plus scale #5/4 AC/HS; on discharge, continue jardiance and trulicity, no need for sliding scale on discharge. Continue lantus 55 units HS and humalog 18 units tid.   Continue outpatient follow up with endo.

## 2024-02-08 NOTE — QUICK NOTE
Patient refusing scheduled aspirin and prefers to discuss with provider prior to taking medication.

## 2024-02-08 NOTE — ASSESSMENT & PLAN NOTE
Creatine on admission 1.61, Baseline creatinine 1.1  Suspect secondary to dehydration   Hold ACE/ARB and diuretic therapy  Avoid hypotension, nephrotoxins, and NSAIDS if possible    IV fluids   Strict I & Os  Urinary retention protocol and bladder scan  Resolved.

## 2024-02-08 NOTE — DISCHARGE SUMMARY
[facility]  Discharge- Alli Burt Sr. 1965, 58 y.o. male MRN: [id number]  Unit/Bed#: MS [facility] Encounter: [id number]  Primary Care Provider: No primary care provider on file.   Date and time admitted to hospital: 2/6/2024  3:59 PM    * Stroke-like symptoms  Assessment & Plan  Presents to ED with slurred speech and taking longer time to answer questions per patient's friend. Feels dizzy and tired since waking up.   NIH in ED 1, NIH now = 1; symptoms of dysarthria  CT scan Hypodensities within the left anterior limb of the internal capsule/basal ganglia may represent age-indeterminate infarcts. Differential includes microangiopathic changes among other etiologies. Clinical correlation advised.   CTA head/neck not done.   MRI brain: No acute infarction, edema, or mass effect. Chronic lacunar infarct involving the anterior limb of the left internal capsule chronic to the CT hypodensity. Few punctate hyperintense T2/FLAIR foci are noted within the periventricular and subcortical white matter which are nonspecific and can be seen with vasculitis, migrainous angiopathy, or precocious microangiopathic changes.   Stroke pathway  Permissive hypertension if s/s <24 hours   DVT prophylaxis   PT/OT/ST  Statin  Lipid pane with elevated cholesterol, triglycerides 939, LDL: 153, TSH: 69.699, A1c: 12.9  Telemetry, neuro checks   Neurology consult: MRI brain, If negative, then addressing other comorbid conditions as per primary team.   No stroke  Continue close management of sugars to prevent further episodes.     Type 2 diabetes mellitus with hyperosmolar hyperglycemic state (HHS) (HCC)  Assessment & Plan    Lab Results   Component Value Date    HGBA1C 12.9 (H) 02/07/2024     Presents to ED with slurred speech and taking longer to answer questions per patient's friend. Patient's home diabetic regimen includes jardiance, trulicity, novolog 18 units, and lantus 50 units - appears outpatient  endo office has been telling patient to come to the ED due to sugars being high and patient was out of meds for some time.   Glucose in the ED on   Anion gap: 12  Betahydroxybutyrate 0.1  Lactic acid 2.3  Urine ketones: negative  Potassium: 4.6  Sodium: 126 (corrected to 139)  Phosphate normal  WBC normal  Strict I & Os  Aggressive IV hydration  Insulin drip and IVF rehydration started, continue to monitor sugars  Endo consult: Recommend lantus 55 units qHS, humalog 18 units qAC plus scale #5/4 AC/HS; on discharge, continue jardiance and trulicity, no need for sliding scale on discharge. Continue lantus 55 units HS and humalog 18 units tid.   Continue outpatient follow up with endo.     Acute metabolic encephalopathy-resolved as of 2/8/2024  Assessment & Plan  Suspected due to hyperglycemia in setting of sugars >500. Stroke workup was initiated. Infectious workup being done  UA without evidence of infection  Mildly elevated lactic acid  Blood cultures no growth at 24 hours  Ethanol negative  UDS negative  No leukocytosis  Procal normal  Control sugars and monitor symptoms  Alert and oriented.     JOHN (acute kidney injury) (HCC)-resolved as of 2/8/2024  Assessment & Plan  Creatine on admission 1.61, Baseline creatinine 1.1  Suspect secondary to dehydration   Hold ACE/ARB and diuretic therapy  Avoid hypotension, nephrotoxins, and NSAIDS if possible    IV fluids   Strict I & Os  Urinary retention protocol and bladder scan  Resolved.     Pseudohyponatremia  Assessment & Plan  Low sodium in light of hyperglycemia. Sodium on admission 126 with glucose of 629  Sodium corrects to 139  Continue to monitor patient's sodium while correcting sugars  Continues to correct to be normal.     Chronic obstructive pulmonary disease with acute exacerbation (HCC)-resolved as of 2/8/2024  Assessment & Plan  Patient presented to ED with slurred speech and delayed responses  Outpatient medications: dulera prn  Baseline oxygen use:  none  CT CAP: No evidence of an acute inflammatory process.   Sputum cultures ordered  Respiratory protocol    Steroids not ordered due to patient currently being in HHS, will do duo-nebs  Azithromycin for exacerbation - completed  No further wheezing on exam    Other specified hypothyroidism  Assessment & Plan  Patient currently taking synthroid 250 mcg outpatient, outpatient endo recommending coming to ED for evaluation due to elevated sugars and severe hypothryoidism  TSH elevated, T4 normal  Endo consult: May continue present Rx LT4 and should have thyroid labs checked in 4-6 weeks for close monitoring.     Alcoholic cirrhosis (HCC)  Assessment & Plan  With history of alcoholic cirrhosis  Ethanol normal on admission  UDS negative  LFTs elevated  Ammonia normal  CT CAP: Hepatomegaly and diffuse hepatic steatosis. Liver is diffusely heterogeneous. Evaluation is limited due to the lack of IV contrast and tumor is not excluded. Given the history of cirrhosis, recommend MRI using liver mass protocol. No evidence of an acute inflammatory process.  Continue to monitor and outpatient follow up.   Discussed with patient regarding needing outpatient MRI, verbalized understanding.     Hypertension  Assessment & Plan  History of HTN on novrasc and lisinopril  Currently with elevated creatinine, hold lisinopril and hold norvasc in light of stroke like symptoms  Allow for permissive HTN at this time  Avoid hypotension    Seizure (HCC)  Assessment & Plan  With history of seizures, currently following with neuro outpatient  Continue home seizure medications  Keppra and lamictal level pending      Medical Problems       Resolved Problems  Date Reviewed: 2/8/2024            Resolved    Acute metabolic encephalopathy 2/8/2024     Resolved by  Ashley Stevens PA-C    JOHN (acute kidney injury) (HCC) 2/8/2024     Resolved by  Ashley Stevens PA-C    Chronic obstructive pulmonary disease with acute exacerbation (HCC) 2/8/2024      Resolved by  Ashley Stevens PA-C        Discharging Physician / Practitioner: Ashley Stevens PA-C  PCP: No primary care provider on file.  Admission Date:   Admission Orders (From admission, onward)       Ordered        02/06/24 1803  INPATIENT ADMISSION  Once                          Discharge Date: 02/08/24    Consultations During Hospital Stay:  Endocrinology, neurology    Procedures Performed:   none    Significant Findings / Test Results:   MRI brain wo contrast   Final Result by Alvin Hernadez MD (02/07 1141)      No acute infarction, edema, or mass effect.      Chronic lacunar infarct involving the anterior limb of the left internal capsule chronic to the CT hypodensity.      Few punctate hyperintense T2/FLAIR foci are noted within the periventricular and subcortical white matter which are nonspecific and can be seen with vasculitis, migrainous angiopathy, or precocious microangiopathic changes.      Sinus mucosal disease.         Workstation performed: [id number]         CT chest abdomen pelvis wo contrast   Final Result by Hanna Bocanegra MD (02/06 1710)      Hepatomegaly and diffuse hepatic steatosis. Liver is diffusely heterogeneous. Evaluation is limited due to the lack of IV contrast and tumor is not excluded. Given the history of cirrhosis, recommend MRI using liver mass protocol.      No evidence of an acute inflammatory process.               Workstation performed: [id number]         CT stroke alert brain   Final Result by Uriel Santa MD (02/06 1635)      -Hypodensities within the left anterior limb of the internal capsule/basal ganglia may represent age-indeterminate infarcts. Differential includes microangiopathic changes among other etiologies. Clinical correlation advised.            I personally communicated the findings via telephone with Shahnaz Rodriguez at approximately 4:32 p.m. on 2/6/2024.      Workstation performed: [id number]           Creatinine: 1.61  Sodium: 126  (corrected to 139)  Glucose: 629  A1c: 12.9  LDL: 153  Lipid panel: cholesterol: 408, triglycerides: 939, HDL: 35  Lactic: 2.3 > 2.9 > 2.2 > 1.7  Troponins normal  TSH: 69.699  T4 normal  CK: 657  Acute hepatitis panel negative  Serum osmo: 325  UDS negative  Ethanol negative  Ammonia normal  Beta hydroxybutyrate: 0.1  COVID/Flu/RSV negative    Incidental Findings:   See above   I reviewed the above mentioned incidental findings with the patient and/or family and they expressed understanding.    Test Results Pending at Discharge (will require follow up):   Levetiracetam level, lamotrigine level     Outpatient Tests Requested:  MRI liver to be done outpatient  Follow up with PCP in 1 week  Follow up with endocrinology 1-2 weeks  Follow up with GI outpatient 1-2 weeks  Repeat thyroid testing in 4-6 weeks    Complications:  none    Reason for Admission: HHS, AMS, stroke like symptoms, JOHN    Hospital Course:   Alli Burt Sr. is a 58 y.o. male patient who originally presented to the hospital on 2/6/2024 due to altered mental status, slurred speech per friend. Started in the AM. Stroke alert called in the ED. Patient found to have sugars 629. Neuro recommended CT brain and MRI brain. If no stroke, no need for echo or further workup if negative MRI. MRI was negative. No further recs from neuro. Was found to be in HHS and suspected this to be the reason for AMS and stroke like symptoms. Placed on insulin gtt and IVF rehydration. Had john likely due to dehydration which resolved with IVFs. Had pseudohyponatremia due to hyperglycemia and Na corrected to be normal. Endo consulted due to HHS and elevated TSH. Recommend d/c insulin gtt and start lantus 55 units hs and humalog 18 units tid. On discharge can continue same synthroid dose and resume jardiance and trulicity. Also with COPD exacerbation, started on Duo-Nebs and wheezing resolved. Sugars remained controlled off insulin gtt and transition to subq insulin.  "Continue this regimen on discharge. Should follow up with PCP in 1 week. Follow up with endo in 1-2 weeks. CT AP with results above, recommending outpatient MRI abdomen to be done. Follow up with GI outpatient in 1-2 weeks.     Please see above list of diagnoses and related plan for additional information.     Condition at Discharge: fair    Discharge Day Visit / Exam:   Subjective:  Patient seen and examined. Doing well today. Offers no current complaints. No nausea, vomiting, diarrhea, constipation, chest pain, SOB, fever, chills. Looking forward to going home.   Vitals: Blood Pressure: 140/79 (02/08/24 0429)  Pulse: 82 (02/08/24 0429)  Temperature: 97.9 °F (36.6 °C) (02/08/24 0829)  Temp Source: Temporal (02/08/24 0829)  Respirations: 18 (02/08/24 0829)  Height: 5' 8\" (172.7 cm) (02/06/24 2130)  Weight - Scale: 110 kg (243 lb 3.2 oz) (02/06/24 2130)  SpO2: 94 % (02/08/24 0429)  Exam:   Physical Exam  Vitals reviewed.   Constitutional:       General: He is not in acute distress.     Appearance: He is obese. He is not ill-appearing or toxic-appearing.   HENT:      Mouth/Throat:      Mouth: Mucous membranes are moist.   Cardiovascular:      Rate and Rhythm: Normal rate and regular rhythm.      Heart sounds: No murmur heard.  Pulmonary:      Effort: No respiratory distress.      Breath sounds: No stridor. No wheezing.   Abdominal:      General: Bowel sounds are normal. There is no distension.      Palpations: Abdomen is soft. There is no mass.      Tenderness: There is no abdominal tenderness.   Musculoskeletal:      Right lower leg: No edema.      Left lower leg: No edema.   Skin:     General: Skin is warm and dry.   Neurological:      General: No focal deficit present.      Mental Status: He is oriented to person, place, and time.   Psychiatric:         Mood and Affect: Mood normal.         Behavior: Behavior normal.          Discussion with Family: Updated  (friend) via phone.    Discharge " instructions/Information to patient and family:   See after visit summary for information provided to patient and family.      Provisions for Follow-Up Care:  See after visit summary for information related to follow-up care and any pertinent home health orders.      Mobility at time of Discharge:   Basic Mobility Inpatient Raw Score: 18  JH-HLM Goal: 6: Walk 10 steps or more  JH-HLM Achieved: 7: Walk 25 feet or more  HLM Goal achieved. Continue to encourage appropriate mobility.     Disposition:   Home    Planned Readmission: no     Discharge Statement:  I spent 51 minutes discharging the patient. This time was spent on the day of discharge. I had direct contact with the patient on the day of discharge. Greater than 50% of the total time was spent examining patient, answering all patient questions, arranging and discussing plan of care with patient as well as directly providing post-discharge instructions.  Additional time then spent on discharge activities.    Discharge Medications:  See after visit summary for reconciled discharge medications provided to patient and/or family.      **Please Note: This note may have been constructed using a voice recognition system**

## 2024-02-08 NOTE — ASSESSMENT & PLAN NOTE
Low sodium in light of hyperglycemia. Sodium on admission 126 with glucose of 629  Sodium corrects to 139  Continue to monitor patient's sodium while correcting sugars  Continues to correct to be normal.

## 2024-02-08 NOTE — ASSESSMENT & PLAN NOTE
With history of alcoholic cirrhosis  Ethanol normal on admission  UDS negative  LFTs elevated  Ammonia normal  CT CAP: Hepatomegaly and diffuse hepatic steatosis. Liver is diffusely heterogeneous. Evaluation is limited due to the lack of IV contrast and tumor is not excluded. Given the history of cirrhosis, recommend MRI using liver mass protocol. No evidence of an acute inflammatory process.  Continue to monitor and outpatient follow up.   Discussed with patient regarding needing outpatient MRI, verbalized understanding.

## 2024-02-08 NOTE — ASSESSMENT & PLAN NOTE
Presents to ED with slurred speech and taking longer time to answer questions per patient's friend. Feels dizzy and tired since waking up.   NIH in ED 1, NIH now = 1; symptoms of dysarthria  CT scan Hypodensities within the left anterior limb of the internal capsule/basal ganglia may represent age-indeterminate infarcts. Differential includes microangiopathic changes among other etiologies. Clinical correlation advised.   CTA head/neck not done.   MRI brain: No acute infarction, edema, or mass effect. Chronic lacunar infarct involving the anterior limb of the left internal capsule chronic to the CT hypodensity. Few punctate hyperintense T2/FLAIR foci are noted within the periventricular and subcortical white matter which are nonspecific and can be seen with vasculitis, migrainous angiopathy, or precocious microangiopathic changes.   Stroke pathway  Permissive hypertension if s/s <24 hours   DVT prophylaxis   PT/OT/ST  Statin  Lipid pane with elevated cholesterol, triglycerides 939, LDL: 153, TSH: 69.699, A1c: 12.9  Telemetry, neuro checks   Neurology consult: MRI brain, If negative, then addressing other comorbid conditions as per primary team.   No stroke  Continue close management of sugars to prevent further episodes.

## 2024-02-08 NOTE — CASE MANAGEMENT
Case Management Discharge Planning Note    Patient name Alli Burt Sr.  Location /-01 MRN [id number]  : 1965 Date 2024       Current Admission Date: 2024  Current Admission Diagnosis:Stroke-like symptoms   Patient Active Problem List    Diagnosis Date Noted    Stroke-like symptoms 2024    Type 2 diabetes mellitus with hyperosmolar hyperglycemic state (HHS) (HCC) 2024    Seizure (HCC) 2024    Hypertension 2024    Acute metabolic encephalopathy 2024    Pseudohyponatremia 2024    Alcoholic cirrhosis (HCC) 2024    Other specified hypothyroidism 2024    Chronic obstructive pulmonary disease with acute exacerbation (HCC) 2024      LOS (days): 2  Geometric Mean LOS (GMLOS) (days): 4.1  Days to GMLOS:2.3     OBJECTIVE:  Risk of Unplanned Readmission Score: 14.4         Current admission status: Inpatient   Preferred Pharmacy:   [facility] [address] [place] [address] [place] - [address] [facility]  [address] [facility]   Phone: [phone] Fax: [phone]    [place] [facility] - [place], [place] - [address]  [address]  [place]   Phone: [phone] Fax: [phone]    Primary Care Provider: No primary care provider on file.    Primary Insurance: MEDICARE  Secondary Insurance: [facility]    DISCHARGE DETAILS:    Discharge planning discussed with:: Patient      CM call to [address] [address] [phone]  On hold for over 20 minutes.  Left  at 1220 pm for office to call patient on cell phone number to set appointment to establish with PCP.  Information added to AVS for patient to follow up as able.      Patient confirms ride today at [place].   No other CM needs.   IMM reviewed with patient, patient agrees with discharge determination.       Other Referral/Resources/Interventions Provided:  Referral  Comments: Patient to follow and call [facility] to see if they can accept any new patients.  Information added to AVS.  CM attempted to call and set appointment but was limited by time constraints.  Patient asked to follow up on this need to get an appointment.      Treatment Team Recommendation: Home        IMM Given (Date):: 02/08/24  IMM Given to:: Patient

## 2024-02-08 NOTE — PLAN OF CARE
Problem: PAIN - ADULT  Goal: Verbalizes/displays adequate comfort level or baseline comfort level  Description: Interventions:  - Encourage patient to monitor pain and request assistance  - Assess pain using appropriate pain scale  - Administer analgesics based on type and severity of pain and evaluate response  - Implement non-pharmacological measures as appropriate and evaluate response  - Consider cultural and social influences on pain and pain management  - Notify physician/advanced practitioner if interventions unsuccessful or patient reports new pain  Outcome: Progressing     Problem: INFECTION - ADULT  Goal: Absence or prevention of progression during hospitalization  Description: INTERVENTIONS:  - Assess and monitor for signs and symptoms of infection  - Monitor lab/diagnostic results  - Monitor all insertion sites, i.e. indwelling lines, tubes, and drains  - Monitor endotracheal if appropriate and nasal secretions for changes in amount and color  - Strawn appropriate cooling/warming therapies per order  - Administer medications as ordered  - Instruct and encourage patient and family to use good hand hygiene technique  - Identify and instruct in appropriate isolation precautions for identified infection/condition  Outcome: Progressing     Problem: SAFETY ADULT  Goal: Patient will remain free of falls  Description: INTERVENTIONS:  - Educate patient/family on patient safety including physical limitations  - Instruct patient to call for assistance with activity   - Consult OT/PT to assist with strengthening/mobility   - Keep Call bell within reach  - Keep bed low and locked with side rails adjusted as appropriate  - Keep care items and personal belongings within reach  - Initiate and maintain comfort rounds  - Make Fall Risk Sign visible to staff  - Offer Toileting every 2 Hours, in advance of need if unable to notify staff  - Initiate/Maintain bed/chair alarm  - Apply yellow socks and bracelet for high  fall risk patients  - Consider moving patient to room near nurses station  Outcome: Progressing  Goal: Maintain or return to baseline ADL function  Description: INTERVENTIONS:  -  Assess patient's ability to carry out ADLs; assess patient's baseline for ADL function and identify physical deficits which impact ability to perform ADLs (bathing, care of mouth/teeth, toileting, grooming, dressing, etc.)  - Assess/evaluate cause of self-care deficits   - Assess range of motion  - Assess patient's mobility; develop plan if impaired  - Assess patient's need for assistive devices and provide as appropriate  - Encourage maximum independence but intervene and supervise when necessary  - Involve family in performance of ADLs  - Assess for home care needs following discharge   - Consider OT consult to assist with ADL evaluation and planning for discharge  - Provide patient education as appropriate  Outcome: Progressing  Goal: Maintains/Returns to pre admission functional level  Description: INTERVENTIONS:  - Perform AM-PAC 6 Click Basic Mobility/ Daily Activity assessment daily.  - Set and communicate daily mobility goal to care team and patient/family/caregiver.   - Collaborate with rehabilitation services on mobility goals if consulted  - Perform Range of Motion 3 times a day.  - Reposition patient every 2 hours if unable to reposition self  - Out of bed for toileting if medically appropriate  - Record patient progress and toleration of activity level   Outcome: Progressing     Problem: DISCHARGE PLANNING  Goal: Discharge to home or other facility with appropriate resources  Description: INTERVENTIONS:  - Identify barriers to discharge w/patient and caregiver  - Arrange for needed discharge resources and transportation as appropriate  - Identify discharge learning needs (meds, wound care, etc.)  - Arrange for interpretive services to assist at discharge as needed  - Refer to Case Management Department for coordinating  discharge planning if the patient needs post-hospital services based on physician/advanced practitioner order or complex needs related to functional status, cognitive ability, or social support system  Outcome: Progressing     Problem: Knowledge Deficit  Goal: Patient/family/caregiver demonstrates understanding of disease process, treatment plan, medications, and discharge instructions  Description: Complete learning assessment and assess knowledge base.  Interventions:  - Provide teaching at level of understanding  - Provide teaching via preferred learning methods  Outcome: Progressing     Problem: NEUROSENSORY - ADULT  Goal: Achieves stable or improved neurological status  Description: INTERVENTIONS  - Monitor and report changes in neurological status  - Monitor vital signs such as temperature, blood pressure, glucose, and any other labs ordered   - Initiate measures to prevent increased intracranial pressure  - Monitor for seizure activity and implement precautions if appropriate      Outcome: Progressing  Goal: Remains free of injury related to seizures activity  Description: INTERVENTIONS  - Maintain airway, patient safety  and administer oxygen as ordered  - Monitor patient for seizure activity, document and report duration and description of seizure to physician/advanced practitioner  - If seizure occurs,  ensure patient safety during seizure  - Reorient patient post seizure  - Seizure pads on all 4 side rails  - Instruct patient/family to notify RN of any seizure activity including if an aura is experienced  - Instruct patient/family to call for assistance with activity based on nursing assessment  - Administer anti-seizure medications if ordered    Outcome: Progressing  Goal: Achieves maximal functionality and self care  Description: INTERVENTIONS  - Monitor swallowing and airway patency with patient fatigue and changes in neurological status  - Encourage and assist patient to increase activity and self  care.   - Encourage visually impaired, hearing impaired and aphasic patients to use assistive/communication devices  Outcome: Progressing     Problem: METABOLIC, FLUID AND ELECTROLYTES - ADULT  Goal: Electrolytes maintained within normal limits  Description: INTERVENTIONS:  - Monitor labs and assess patient for signs and symptoms of electrolyte imbalances  - Administer electrolyte replacement as ordered  - Monitor response to electrolyte replacements, including repeat lab results as appropriate  - Instruct patient on fluid and nutrition as appropriate  Outcome: Progressing  Goal: Fluid balance maintained  Description: INTERVENTIONS:  - Monitor labs   - Monitor I/O and WT  - Instruct patient on fluid and nutrition as appropriate  - Assess for signs & symptoms of volume excess or deficit  Outcome: Progressing  Goal: Glucose maintained within target range  Description: INTERVENTIONS:  - Monitor Blood Glucose as ordered  - Assess for signs and symptoms of hyperglycemia and hypoglycemia  - Administer ordered medications to maintain glucose within target range  - Assess nutritional intake and initiate nutrition service referral as needed  Outcome: Progressing     Problem: Neurological Deficit  Goal: Neurological status is stable or improving  Description: Interventions:  - Monitor and assess patient's level of consciousness, motor function, sensory function, and level of assistance needed for ADLs.   - Monitor and report changes from baseline. Collaborate with interdisciplinary team to initiate plan and implement interventions as ordered.   - Provide and maintain a safe environment.  - Consider seizure precautions.  - Consider fall precautions.  - Consider aspiration precautions.  - Consider bleeding precautions.  Outcome: Progressing     Problem: Activity Intolerance/Impaired Mobility  Goal: Mobility/activity is maintained at optimum level for patient  Description: Interventions:  - Assess and monitor patient  barriers  to mobility and need for assistive/adaptive devices.  - Assess patient's emotional response to limitations.  - Collaborate with interdisciplinary team and initiate plans and interventions as ordered.  - Encourage independent activity per ability.  - Maintain proper body alignment.  - Perform active/passive rom as tolerated/ordered.  - Plan activities to conserve energy.  - Turn patient as appropriate  Outcome: Progressing     Problem: Communication Impairment  Goal: Ability to express needs and understand communication  Description: Assess patient's communication skills and ability to understand information.  Patient will demonstrate use of effective communication techniques, alternative methods of communication and understanding even if not able to speak.     - Encourage communication and provide alternate methods of communication as needed.  - Collaborate with case management/ for discharge needs.  - Include patient/family/caregiver in decisions related to communication.  Outcome: Progressing     Problem: Potential for Aspiration  Goal: Non-ventilated patient's risk of aspiration is minimized  Description: Assess and monitor vital signs, respiratory status, and labs (WBC).  Monitor for signs of aspiration (tachypnea, cough, rales, wheezing, cyanosis, fever).    - Assess and monitor patient's ability to swallow.  - Place patient up in chair to eat if possible.  - HOB up at 90 degrees to eat if unable to get patient up into chair.  - Supervise patient during oral intake.   - Instruct patient/ family to take small bites.  - Instruct patient/ family to take small single sips when taking liquids.  - Follow patient-specific strategies generated by speech pathologist.  Outcome: Progressing  Goal: Ventilated patient's risk of aspiration is minimized  Description: Assess and monitor vital signs, respiratory status, airway cuff pressure, and labs (WBC).  Monitor for signs of aspiration (tachypnea, cough,  rales, wheezing, cyanosis, fever).    - Elevate head of bed 30 degrees if patient has tube feeding.  - Monitor tube feeding.  Outcome: Progressing     Problem: Nutrition  Goal: Nutrition/Hydration status is improving  Description: Monitor and assess patient's nutrition/hydration status for malnutrition (ex- brittle hair, bruises, dry skin, pale skin and conjunctiva, muscle wasting, smooth red tongue, and disorientation). Collaborate with interdisciplinary team and initiate plan and interventions as ordered.  Monitor patient's weight and dietary intake as ordered or per policy. Utilize nutrition screening tool and intervene per policy. Determine patient's food preferences and provide high-protein, high-caloric foods as appropriate.     - Assist patient with eating.  - Allow adequate time for meals.  - Encourage patient to take dietary supplement as ordered.  - Collaborate with clinical nutritionist.  - Include patient/family/caregiver in decisions related to nutrition.  Outcome: Progressing

## 2024-02-08 NOTE — ASSESSMENT & PLAN NOTE
Patient presented to ED with slurred speech and delayed responses  Outpatient medications: dulera prn  Baseline oxygen use: none  CT CAP: No evidence of an acute inflammatory process.   Sputum cultures ordered  Respiratory protocol    Steroids not ordered due to patient currently being in [facility], will do duo-nebs  Azithromycin for exacerbation - completed  No further wheezing on exam

## 2024-02-08 NOTE — ASSESSMENT & PLAN NOTE
With history of seizures, currently following with neuro outpatient  Continue home seizure medications  Keppra and lamictal level pending

## 2024-02-09 LAB
BACTERIA SPT RESP CULT: ABNORMAL
GRAM STN SPEC: ABNORMAL
LAMOTRIGINE SERPL-MCNC: <1 UG/ML (ref 2–20)
LEVETIRACETAM SERPL-MCNC: <2 UG/ML (ref 10–40)

## 2024-02-11 LAB
BACTERIA BLD CULT: NORMAL
BACTERIA BLD CULT: NORMAL